# Patient Record
Sex: MALE | Race: WHITE | Employment: FULL TIME | URBAN - METROPOLITAN AREA
[De-identification: names, ages, dates, MRNs, and addresses within clinical notes are randomized per-mention and may not be internally consistent; named-entity substitution may affect disease eponyms.]

---

## 2017-01-03 ENCOUNTER — HOSPITAL ENCOUNTER (OUTPATIENT)
Dept: PHYSICAL THERAPY | Age: 57
Discharge: HOME | End: 2017-01-03
Payer: COMMERCIAL

## 2017-01-03 PROCEDURE — 97161 PT EVAL LOW COMPLEX 20 MIN: CPT | Performed by: PHYSICAL THERAPIST

## 2017-01-03 PROCEDURE — 97014 ELECTRIC STIMULATION THERAPY: CPT | Performed by: PHYSICAL THERAPIST

## 2017-01-03 PROCEDURE — 97110 THERAPEUTIC EXERCISES: CPT | Performed by: PHYSICAL THERAPIST

## 2017-01-04 NOTE — PROGRESS NOTES
[address] [facility]  Phone: [phone]  Fax: [phone]    Plan of Care/ Statement of Necessity for Physical Therapy Services 2-15    Patient name: Mehnaz Armijo  : 1960  Provider#: [id number]  Referral source: Anish Epperson IV*      Medical/Treatment Diagnosis: Low back pain [M54.5]     Prior Hospitalization: see medical history     Comorbidities:DM, PMH of testicular cancer  Prior Level of Function: Independent, no limitations with all ADLs  Medications: Verified on Patient Summary List    Start of Care: 17      Onset Date: 2016      The [address] [place] [address] and following information is based on the information from the initial evaluation. Assessment/ key information: Patient presents with right-sided lumbar paraspinal pain with significant limitations in functional activities that require lumbar extension. 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 He did well with gentle therapeutic exercises aimed at improving core stabilization and modalities for pain control. He should continue to do well with PT to allow for a gradual return to PLOF. Evaluation Complexity History MEDIUM  Complexity : 1-2 comorbidities / personal factors will impact the outcome/ POC ; Examination MEDIUM Complexity : 3 Standardized tests and measures addressing body structure, function, activity limitation and / or participation in recreation  ;Presentation MEDIUM Complexity : Evolving with changing characteristics  ; Clinical Decision Making MEDIUM Complexity : FOTO score of 26-74  Overall Complexity Rating: MEDIUM    Problem List: pain affecting function, decrease ROM, decrease strength, impaired gait/ balance, decrease ADL/ functional abilitiies, decrease activity tolerance, decrease flexibility/ joint mobility and decrease transfer abilities   Treatment Plan may include any combination of the following: Therapeutic exercise, Therapeutic activities, Neuromuscular re-education, Physical agent/modality, Gait/balance training, Manual therapy, Patient education, Self Care training, Functional mobility training, Home safety training and Stair training  Patient / Family readiness to learn indicated by: asking questions, trying to perform skills and interest  Persons(s) to be included in education: patient (P)  Barriers to Learning/Limitations: None  Patient Goal (s): 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   Patient Self Reported Health Status: excellent  Rehabilitation Potential: excellent    Short Term Goals: To be accomplished in 8 treatments:  1. Patient will be able to pick 10# off the floor with <2/10 low back pain. 2. Patient will be able to carry 20# at his side for 50 ft. With <2/10 low back pain. 3. Patient will be able to perform a sit-to-stand with <2/10 low back pain. Long Term Goals: To be accomplished in 16 treatments:  1. Patient will be able to perform a toiletting tasks with no pain or limitation. 2. Patient will be able to perform all bed mobility tasks with no pain or limitation. 3. Patient will be able to pick 20# off the floor with no pain or limitation. Frequency / Duration: Patient to be seen 2 times per week for 8 weeks. Patient/ Caregiver education and instruction: self care, activity modification and exercises    [x]  Plan of care has been reviewed with GREGORIO Odom, PT , DPT, OCS, Cert. DN   1/4/2017 10:40 AM    ________________________________________________________________________    I certify that the above Therapy Services are being furnished while the patient is under my care. I agree with the treatment plan and certify that this therapy is necessary.     [facility] Signature:____________________  Date:____________Time: _________

## 2017-01-06 ENCOUNTER — HOSPITAL ENCOUNTER (OUTPATIENT)
Dept: PHYSICAL THERAPY | Age: 57
Discharge: HOME | End: 2017-01-06
Payer: COMMERCIAL

## 2017-01-06 PROCEDURE — 97014 ELECTRIC STIMULATION THERAPY: CPT | Performed by: PHYSICAL THERAPIST

## 2017-01-06 PROCEDURE — 97110 THERAPEUTIC EXERCISES: CPT | Performed by: PHYSICAL THERAPIST

## 2017-01-06 NOTE — PROGRESS NOTES
PT DAILY TREATMENT NOTE 2-15    Patient Name: Pattie Jimenez  Date:2017  : 1960  [x]  Patient  Verified  Payor: Ned Dose / Plan: Mynor Carlisle / Product Type: PPO /    In time:12:00 PM  Out time:1:10 PM  Total Treatment Time (min): 70  Visit #: 2     Treatment Area: Low back pain [M54.5]    SUBJECTIVE  Pain Level (0-10 scale): 1/10 at rest, 3/10 with extension  Any medication changes, allergies to medications, adverse drug reactions, diagnosis change, or new procedure performed?: [x] No    [] Yes (see summary sheet for update)  Subjective functional status/changes:   [] No changes reported  Patient reports a significant improvement in symptoms, but is still considering being held out of work next week.     OBJECTIVE    Modality rationale: decrease pain and increase tissue extensibility to improve the patients ability to lift heavy objects without pain   Min Type Additional Details   15 [x] Estim: []Att   [x]Unatt        []TENS instruct                  [x]IFC  []Premod   []NMES                     []Other:  []w/US   []w/ice   [x]w/heat  Position: supine  Location: right lumbar    []  Traction: [] Cervical       []Lumbar                       [] Prone          []Supine                       []Intermittent   []Continuous Lbs:  [] before manual  [] after manual  []w/heat    []  Ultrasound: []Continuous   [] Pulsed at:                            []1MHz   []3MHz Location:  W/cm2:    []  Paraffin         Location:  []w/heat    []  Ice     []  Heat  []  Ice massage Position:  Location:    []  Laser  []  Other: Position:  Location:    []  Vasopneumatic Device Pressure:       [] lo [] med [] hi   Temperature:    [x] Skin assessment post-treatment:  [x]intact []redness- no adverse reaction    []redness  adverse reaction:     55 min Therapeutic Exercise:  [x] See flow sheet :   Rationale: increase ROM, increase strength, improve coordination, improve balance and increase proprioception\ to improve the patients ability to lift heavy objects without pain          With   [] TE   [] TA   [] neuro   [] other: Patient Education: [x] Review HEP    [] Progressed/Changed HEP based on:   [] positioning   [] body mechanics   [] transfers   [] heat/ice application    [] other:      Pain Level (0-10 scale) post treatment: 0    ASSESSMENT/Changes in Function:     Patient will continue to benefit from skilled PT services to modify and progress therapeutic interventions, address functional mobility deficits, address ROM deficits, address strength deficits, analyze and address soft tissue restrictions, analyze and cue movement patterns, analyze and modify body mechanics/ergonomics and assess and modify postural abnormalities to attain remaining goals. []  See Plan of Care  []  See progress note/recertification  []  See Discharge Summary         Progress towards goals / Updated goals:  Patient tolerated today's progression of therapeutic exercises very well and is doing well overall. PLAN  [x]  Upgrade activities as tolerated     [x]  Continue plan of care  []  Update interventions per flow sheet       []  Discharge due to:_  []  Other:_      Monet Tong, PT , DPT, OCS, Cert.  DN   1/6/2017  1:46 PM

## 2017-01-09 ENCOUNTER — HOSPITAL ENCOUNTER (OUTPATIENT)
Dept: PHYSICAL THERAPY | Age: 57
Discharge: HOME | End: 2017-01-09
Payer: COMMERCIAL

## 2017-01-09 PROCEDURE — 97110 THERAPEUTIC EXERCISES: CPT

## 2017-01-09 PROCEDURE — 97140 MANUAL THERAPY 1/> REGIONS: CPT

## 2017-01-09 PROCEDURE — 97014 ELECTRIC STIMULATION THERAPY: CPT

## 2017-01-09 NOTE — PROGRESS NOTES
PT DAILY TREATMENT NOTE - [facility] 2-15    Patient Name: Will Ortiz  Date:2017  : 1960  [x]  Patient  Verified  Payor: Shashi Campuzano / Plan: Chantelle Lr / Product Type: PPO /    In time:1245p  Out time:105p  Total Treatment Time (min): 80  Total Timed Codes (min): 65  1:1 Treatment Time ( only): --   Visit #: 3     Treatment Area: Low back pain [M54.5]    SUBJECTIVE  Pain Level (0-10 scale): 3  Any medication changes, allergies to medications, adverse drug reactions, diagnosis change, or new procedure performed?: [x] No    [] Yes (see summary sheet for update)  Subjective functional status/changes:   [] No changes reported  Patient reports he was feeling good today, not having any pain until he lifted his foot to put his sock on and got a sharp pain in right side low back. Patient states the pain is not constant just with certain movements including lifting his leg. Patient states he did shovel his driveway by pushing the snow and only lifted once or twice, but did not have any pain after or the next day. OBJECTIVE    Modality rationale: decrease edema, decrease inflammation, decrease pain, increase tissue extensibility and increase muscle contraction/control to improve the patients ability to lift heavy objects without pain.    Min Type Additional Details   15 [x] Estim: []Att   [x]Unatt        []TENS instruct                  [x]IFC  []Premod   []NMES                     []Other:  []w/US   []w/ice   [x]w/heat  Position: Supine  Location: Low back    []  Traction: [] Cervical       []Lumbar                       [] Prone          []Supine                       []Intermittent   []Continuous Lbs:  [] before manual  [] after manual  []w/heat    []  Ultrasound: []Continuous   [] Pulsed at:                           []1MHz   []3MHz Location:  W/cm2:    [] Paraffin         Location:   []w/heat    []  Ice     []  Heat  []  Ice massage Position:  Location:    []  Laser  []  Other: Position:  Location:      []  Vasopneumatic Device Pressure:       [] lo [] med [] hi   Temperature:      [x] Skin assessment post-treatment:  [x]intact []redness- no adverse reaction    []redness  adverse reaction:     50 min Therapeutic Exercise:  [x] See flow sheet :   Rationale: increase ROM, increase strength, improve coordination, improve balance and increase proprioception to improve the patients ability to heavy objects without pain    15 min Manual Therapy: MFR to R QL, lumbar paraspinals. Rationale: decrease pain, increase ROM, increase tissue extensibility and decrease trigger points to improve the patients ability to lift heavy objects without pain. With   [x] TE   [] TA   [] neuro   [] other: Patient Education: [x] Review HEP    [x] Progressed/Changed HEP based on:   [x] positioning   [x] body mechanics   [] transfers   [] heat/ice application    [] other:      Other Objective/Functional Measures: Patient unable to do bike today due to increased pain in right low back QL area. Patient states slight pain with attempting to lay down at end of treatment, but went away quickly. Pain Level (0-10 scale) post treatment: 0/10    ASSESSMENT/Changes in Function: Patient able to tolerate increase resistance and exercise progression. Patient with increased fatigue and muscle tightness toward end of exercises. Patient with increase tissue turgor and tenderness in R QL and lumbar paraspinals improved after manual.     Patient will continue to benefit from skilled PT services to modify and progress therapeutic interventions, address functional mobility deficits, address ROM deficits, address strength deficits, analyze and address soft tissue restrictions, analyze and cue movement patterns, analyze and modify body mechanics/ergonomics, assess and modify postural abnormalities and instruct in home and community integration to attain remaining goals.      []  See Plan of Care  []  See progress note/recertification  []  See Discharge Summary         Progress towards goals / Updated goals:  Patient continues to improve in strength exercises and progression with no increase in pain.     PLAN  [x]  Upgrade activities as tolerated     [x]  Continue plan of care  [x]  Update interventions per flow sheet       []  Discharge due to:_  []  Other:_      Burnie Daily 1/9/2017  1:10 PM

## 2017-01-12 ENCOUNTER — HOSPITAL ENCOUNTER (OUTPATIENT)
Dept: PHYSICAL THERAPY | Age: 57
Discharge: HOME | End: 2017-01-12
Payer: COMMERCIAL

## 2017-01-12 PROCEDURE — 97140 MANUAL THERAPY 1/> REGIONS: CPT

## 2017-01-12 PROCEDURE — 97110 THERAPEUTIC EXERCISES: CPT

## 2017-01-12 PROCEDURE — 97014 ELECTRIC STIMULATION THERAPY: CPT

## 2017-01-12 NOTE — PROGRESS NOTES
PT DAILY TREATMENT NOTE - [facility] 2-15    Patient Name: Teresa Early  Date:2017  : 1960  [x]  Patient  Verified  Payor: Eric Pierre / Plan: Nigel Sommer / Product Type: PPO /    In time:230p  Out time:345p  Total Treatment Time (min): 75  Total Timed Codes (min): 60  1:1 Treatment Time ( only): --   Visit #: 4     Treatment Area: Low back pain [M54.5]    SUBJECTIVE  Pain Level (0-10 scale): 2/10  Any medication changes, allergies to medications, adverse drug reactions, diagnosis change, or new procedure performed?: [x] No    [] Yes (see summary sheet for update)  Subjective functional status/changes:   [] No changes reported  Patient reports his back is still a little tight and painful on the right side. Patient states he was doing his stretches before he he came and things seem a little tighter. Patient reports he has been able to get up and down from a chair without any pain or problems the last couple of days. OBJECTIVE    Modality rationale: decrease pain and increase tissue extensibility to improve the patients ability to Lift, carry, stand, walk and complete ADL's without pain.    Min Type Additional Details   15 [x] Estim: []Att   [x]Unatt        []TENS instruct                  [x]IFC  []Premod   []NMES                     []Other:  []w/US   []w/ice   [x]w/heat  Position:Supine  Location::Low back    []  Traction: [] Cervical       []Lumbar                       [] Prone          []Supine                       []Intermittent   []Continuous Lbs:  [] before manual  [] after manual  []w/heat    []  Ultrasound: []Continuous   [] Pulsed at:                           []1MHz   []3MHz Location:  W/cm2:    [] Paraffin         Location:   []w/heat    []  Ice     []  Heat  []  Ice massage Position:  Location:    []  Laser  []  Other: Position:  Location:      []  Vasopneumatic Device Pressure:       [] lo [] med [] hi   Temperature:      [x] Skin assessment post-treatment: [x]intact []redness- no adverse reaction    []redness  adverse reaction:     50 min Therapeutic Exercise:  [] See flow sheet :   Rationale: increase ROM and increase strength to improve the patients ability to Lift, carry, stand, walk and complete ADL's without pain. 10 min Manual Therapy: MFR to R QL, lumbar paraspinals and erector spinae    Rationale: decrease pain, increase ROM, increase tissue extensibility and decrease trigger points to improve the patients ability to Lift, carry, stand, walk and complete ADL's without pain. With   [x] TE   [] TA   [] neuro   [] other: Patient Education: [x] Review HEP    [x] Progressed/Changed HEP based on:   [x] positioning   [x] body mechanics   [] transfers   [] heat/ice application    [] other:      Other Objective/Functional Measures:      Pain Level (0-10 scale) post treatment: 0/10    ASSESSMENT/Changes in Function: Patient with increased TTP and tissue turgor R QL and lumbar paraspinals improved after manual. Patient demonstrates improved strength with less fatigue by end of exercises. Patient will continue to benefit from skilled PT services to modify and progress therapeutic interventions, address functional mobility deficits, address ROM deficits, address strength deficits, analyze and address soft tissue restrictions, analyze and cue movement patterns, analyze and modify body mechanics/ergonomics and instruct in home and community integration to attain remaining goals. []  See Plan of Care  []  See progress note/recertification  []  See Discharge Summary         Progress towards goals / Updated goals:  Patient making excellent progress towards goals. Patient able to perform there-exs and progression without increased pain.      PLAN  [x]  Upgrade activities as tolerated     [x]  Continue plan of care  [x]  Update interventions per flow sheet       []  Discharge due to:_  []  Other:_      Beacher Jump 1/12/2017  2:40 PM

## 2017-01-16 ENCOUNTER — HOSPITAL ENCOUNTER (OUTPATIENT)
Dept: PHYSICAL THERAPY | Age: 57
Discharge: HOME | End: 2017-01-16
Payer: COMMERCIAL

## 2017-01-16 PROCEDURE — 97110 THERAPEUTIC EXERCISES: CPT

## 2017-01-16 PROCEDURE — 97014 ELECTRIC STIMULATION THERAPY: CPT

## 2017-01-19 ENCOUNTER — HOSPITAL ENCOUNTER (OUTPATIENT)
Dept: PHYSICAL THERAPY | Age: 57
Discharge: HOME | End: 2017-01-19
Payer: COMMERCIAL

## 2017-01-19 PROCEDURE — 97014 ELECTRIC STIMULATION THERAPY: CPT | Performed by: PHYSICAL THERAPIST

## 2017-01-19 PROCEDURE — 97110 THERAPEUTIC EXERCISES: CPT | Performed by: PHYSICAL THERAPIST

## 2017-01-19 NOTE — PROGRESS NOTES
PT DAILY TREATMENT NOTE - [facility] 2-15    Patient Name: Guevara Peterson  Date:2017  : 1960  [x]  Patient  Verified  Payor: Pham Joseline / Plan: Geovanny Divers / Product Type: PPO /    In time:4:00 PM  Out time:5:15 PM  Total Treatment Time (min): 75  Total Timed Codes (min): 60  1:1 Treatment Time ( only): --   Visit #: 6    Treatment Area: Low back pain [M54.5]    SUBJECTIVE  Pain Level (0-10 scale): 1  Any medication changes, allergies to medications, adverse drug reactions, diagnosis change, or new procedure performed?: [x] No    [] Yes (see summary sheet for update)  Subjective functional status/changes:   [] No changes reported  Patient reports he continues to feel good, but he will see this weekend as he returns to work tomorrow. OBJECTIVE    Modality rationale: decrease pain and increase tissue extensibility to improve the patients ability to lift, carry, sit, stand, ambulate and complete ADL's without pain.    Min Type Additional Details   15 [x] Estim: []Att   [x]Unatt        []TENS instruct                  []IFC  []Premod   []NMES                     []Other:  []w/US   []w/ice   [x]w/heat  Position:supine  Location:Back    []  Traction: [] Cervical       []Lumbar                       [] Prone          []Supine                       []Intermittent   []Continuous Lbs:  [] before manual  [] after manual  []w/heat    []  Ultrasound: []Continuous   [] Pulsed at:                           []1MHz   []3MHz Location:  W/cm2:    [] Paraffin         Location:   []w/heat    []  Ice     []  Heat  []  Ice massage Position:  Location:    []  Laser  []  Other: Position:  Location:      []  Vasopneumatic Device Pressure:       [] lo [] med [] hi   Temperature:      [x] Skin assessment post-treatment:  [x]intact []redness- no adverse reaction    []redness  adverse reaction:     60 min Therapeutic Exercise:  [x] See flow sheet :   Rationale: increase ROM and increase strength to improve the patients ability to lift, carry, sit, stand, ambulate and complete ADL's without pain. With   [x] TE   [] TA   [] neuro   [] other: Patient Education: [x] Review HEP    [x] Progressed/Changed HEP based on:   [] positioning   [] body mechanics   [] transfers   [] heat/ice application    [] other:      Other Objective/Functional Measures:      Pain Level (0-10 scale) post treatment: 0    ASSESSMENT/Changes in Function: Patient able to ride bike for 5 min today with no pain or tightness in back. Patient will continue to benefit from skilled PT services to modify and progress therapeutic interventions, address functional mobility deficits, address ROM deficits, address strength deficits, analyze and address soft tissue restrictions, analyze and cue movement patterns, analyze and modify body mechanics/ergonomics and instruct in home and community integration to attain remaining goals. []  See Plan of Care  []  See progress note/recertification  []  See Discharge Summary         Progress towards goals / Updated goals: Will f/u with patient this weekend on progress with core stability, but patient looks ready to return to work at this time. PLAN  [x]  Upgrade activities as tolerated     [x]  Continue plan of care  [x]  Update interventions per flow sheet       []  Discharge due to:_  []  Other:_      Carolyn Sanderson, PT , DPT, OCS, Cert.  DN   1/19/2017  1:46 PM

## 2017-01-23 ENCOUNTER — HOSPITAL ENCOUNTER (OUTPATIENT)
Dept: PHYSICAL THERAPY | Age: 57
Discharge: HOME | End: 2017-01-23
Payer: COMMERCIAL

## 2017-01-23 PROCEDURE — 97014 ELECTRIC STIMULATION THERAPY: CPT

## 2017-01-23 PROCEDURE — 97110 THERAPEUTIC EXERCISES: CPT

## 2017-01-23 PROCEDURE — 97140 MANUAL THERAPY 1/> REGIONS: CPT

## 2017-01-23 NOTE — PROGRESS NOTES
PT DAILY TREATMENT NOTE - [facility] 2-15    Patient Name: Ethan Benitez  Date:2017  : 1960  [x]  Patient  Verified  Payor: Lm Tong / Plan: Cherri Diaz / Product Type: PPO /    In time:115p  Out time:230p  Total Treatment Time (min): 75  Total Timed Codes (min): 60  1:1 Treatment Time ( only): --   Visit #: 7     Treatment Area: Low back pain [M54.5]    SUBJECTIVE  Pain Level (0-10 scale): 3/10  Any medication changes, allergies to medications, adverse drug reactions, diagnosis change, or new procedure performed?: [x] No    [] Yes (see summary sheet for update)  Subjective functional status/changes:   [] No changes reported  Patient reports he worked this weekend for the first time since he started coming in and his back pain increased. Patient states after the first day his pain was 8/10 and was unable to do any exercises at home. He states the rest of the weekend he was at 6-7 pain after work. OBJECTIVE    Modality rationale: decrease pain and increase tissue extensibility to improve the patients ability to  lift, carry, reach sit, stand, ambulate and complete ADL's without pain.    Min Type Additional Details   15 [x] Estim: []Att   [x]Unatt        []TENS instruct                  []IFC  []Premod   []NMES                     []Other:  []w/US   []w/ice   [x]w/heat  Position:Supine  Location: backqa    []  Traction: [] Cervical       []Lumbar                       [] Prone          []Supine                       []Intermittent   []Continuous Lbs:  [] before manual  [] after manual  []w/heat    []  Ultrasound: []Continuous   [] Pulsed at:                           []1MHz   []3MHz Location:  W/cm2:    [] Paraffin         Location:   []w/heat    []  Ice     []  Heat  []  Ice massage Position:  Location:    []  Laser  []  Other: Position:  Location:      []  Vasopneumatic Device Pressure:       [] lo [] med [] hi   Temperature:      [x] Skin assessment post-treatment:  [x]intact []redness- no adverse reaction    []redness  adverse reaction:     45 min Therapeutic Exercise:  [] See flow sheet :   Rationale: increase ROM and increase strength to improve the patients ability to lift, carry, reach sit, stand, ambulate and complete ADL's without pain. 15 min Manual Therapy: STM R QL and Lumbar paraspinals, Manual QL stretch in left SL    Rationale: decrease pain, increase ROM, increase tissue extensibility and decrease trigger points to improve the patients ability to  lift, carry, reach sit, stand, ambulate and complete ADL's without pain. With   [x] TE   [] TA   [] neuro   [] other: Patient Education: [x] Review HEP    [x] Progressed/Changed HEP based on:   [x] positioning   [x] body mechanics   [] transfers   [] heat/ice application    [] other:      Other Objective/Functional Measures:      Pain Level (0-10 scale) post treatment: 0    ASSESSMENT/Changes in Function: Patient with increased pain while attempting bike. Patient with increased tissue turgor and tightness in  R QL improved after manual. Patient able to complete modified exercises plan without pain. Patient will continue to benefit from skilled PT services to modify and progress therapeutic interventions, address functional mobility deficits, address ROM deficits, address strength deficits, analyze and address soft tissue restrictions, analyze and cue movement patterns, analyze and modify body mechanics/ergonomics and assess and modify postural abnormalities to attain remaining goals. []  See Plan of Care  []  See progress note/recertification  []  See Discharge Summary         Progress towards goals / Updated goals:  Patient continues to progress, demonstrating improvement with strengthening exercises and trunk stabilization with no pain.      PLAN  [x]  Upgrade activities as tolerated     [x]  Continue plan of care  [x]  Update interventions per flow sheet       []  Discharge due to:_  []  Other:_      Carmen Stark Ortega 1/23/2017  1:32 PM

## 2017-01-26 ENCOUNTER — HOSPITAL ENCOUNTER (OUTPATIENT)
Dept: PHYSICAL THERAPY | Age: 57
Discharge: HOME | End: 2017-01-26
Payer: COMMERCIAL

## 2017-01-26 PROCEDURE — 97014 ELECTRIC STIMULATION THERAPY: CPT

## 2017-01-26 PROCEDURE — 97140 MANUAL THERAPY 1/> REGIONS: CPT

## 2017-01-26 PROCEDURE — 97110 THERAPEUTIC EXERCISES: CPT

## 2017-01-26 NOTE — PROGRESS NOTES
PT DAILY TREATMENT NOTE - [facility] 2-15    Patient Name: Penelope Ernst  Date:2017  : 1960  [x]  Patient  Verified  Payor: Shaan Bailey / Plan: Efra Mendez / Product Type: PPO /    In time:210p  Out time:325p  Total Treatment Time (min): 75  Total Timed Codes (min): 60  1:1 Treatment Time ( only): --   Visit #: 8     Treatment Area: Low back pain [M54.5]    SUBJECTIVE  Pain Level (0-10 scale): 1, more muscle soreness than any pain  Any medication changes, allergies to medications, adverse drug reactions, diagnosis change, or new procedure performed?: [x] No    [] Yes (see summary sheet for update)  Subjective functional status/changes:   [] No changes reported  Patient reports he is doing really well today. Patient states he starts working three 12 hour days this weekend and is a little nervous. OBJECTIVE    Modality rationale: decrease pain and increase tissue extensibility to improve the patients ability to sit, stand, lift, reach, carry ambulate and complete ALD's without pain.    Min Type Additional Details   15 [x] Estim: []Att   [x]Unatt        []TENS instruct                  [x]IFC  []Premod   []NMES                     []Other:  []w/US   []w/ice   [x]w/heat  Position:Supine  Location:R back    []  Traction: [] Cervical       []Lumbar                       [] Prone          []Supine                       []Intermittent   []Continuous Lbs:  [] before manual  [] after manual  []w/heat    []  Ultrasound: []Continuous   [] Pulsed at:                           []1MHz   []3MHz Location:  W/cm2:    [] Paraffin         Location:   []w/heat    []  Ice     []  Heat  []  Ice massage Position:  Location:    []  Laser  []  Other: Position:  Location:      []  Vasopneumatic Device Pressure:       [] lo [] med [] hi   Temperature:      [x] Skin assessment post-treatment:  [x]intact []redness- no adverse reaction    []redness  adverse reaction:     50 min Therapeutic Exercise:  [x] See flow sheet :   Rationale: increase ROM, increase strength, improve coordination, improve balance and increase proprioception to improve the patients ability to sit, stand, lift, reach, carry ambulate and complete ALD's without pain. 10 min Manual Therapy: MFR Lumbar paraspinals, QL    Rationale: decrease pain, increase ROM, increase tissue extensibility and decrease trigger points to improve the patients ability to sit, stand, lift, reach, carry ambulate and complete ALD's without pain. With   [x] TE   [] TA   [] neuro   [] other: Patient Education: [x] Review HEP    [x] Progressed/Changed HEP based on:   [x] positioning   [x] body mechanics   [] transfers   [] heat/ice application    [] other:      Other Objective/Functional Measures:      Pain Level (0-10 scale) post treatment: 0    ASSESSMENT/Changes in Function: Patient able to tolerate increased resistance with no pain throughout. Minimal tissue turgor present in R lumbar paraspinals which improved after manual. Educated patient on stretching while at work, when possible, to prevent increased pain and tightness by end of shift. Patient will continue to benefit from skilled PT services to modify and progress therapeutic interventions, address functional mobility deficits, address ROM deficits, address strength deficits, analyze and address soft tissue restrictions, analyze and cue movement patterns, analyze and modify body mechanics/ergonomics and assess and modify postural abnormalities to attain remaining goals. []  See Plan of Care  []  See progress note/recertification  []  See Discharge Summary         Progress towards goals / Updated goals:  Patient able to tolerate increased resistance with no pain making progress towards functional goals.     PLAN  [x]  Upgrade activities as tolerated     [x]  Continue plan of care  [x]  Update interventions per flow sheet       []  Discharge due to:_  []  Other:_      Rick Guevara 1/26/2017  2:13 PM

## 2017-01-30 ENCOUNTER — HOSPITAL ENCOUNTER (OUTPATIENT)
Dept: PHYSICAL THERAPY | Age: 57
Discharge: HOME | End: 2017-01-30
Payer: COMMERCIAL

## 2017-01-30 PROCEDURE — 97140 MANUAL THERAPY 1/> REGIONS: CPT

## 2017-01-30 PROCEDURE — 97014 ELECTRIC STIMULATION THERAPY: CPT

## 2017-01-30 PROCEDURE — 97110 THERAPEUTIC EXERCISES: CPT

## 2017-01-30 NOTE — PROGRESS NOTES
PT DAILY TREATMENT NOTE - [facility] 2-15    Patient Name: Isaac Jensen  Date:2017  : 1960  [x]  Patient  Verified  Payor: Ramos Marte / Plan: Tamra Werner / Product Type: PPO /    In time:1230p  Out time:140p  Total Treatment Time (min): 70  Total Timed Codes (min): 50  1:1 Treatment Time ( only): --   Visit #: 9     Treatment Area: Low back pain [M54.5]    SUBJECTIVE  Pain Level (0-10 scale): 1  Any medication changes, allergies to medications, adverse drug reactions, diagnosis change, or new procedure performed?: [x] No    [] Yes (see summary sheet for update)  Subjective functional status/changes:   [] No changes reported  Patient reported he started working 12 hour shifts and his back was hurting more 3-4/10 pain and was more irritated. Patient reports the pain and irritation slowly got better as the weekend progressed. OBJECTIVE    Modality rationale: decrease pain and increase tissue extensibility to improve the patients ability to  sit, stand, lift, reach, carry and complete ADL's without pain.    Min Type Additional Details   15 [x] Estim: []Att   [x]Unatt        []TENS instruct                  []IFC  [x]Premod   []NMES                     []Other:  []w/US   []w/ice   [x]w/heat  Position:supine  Location:back    []  Traction: [] Cervical       []Lumbar                       [] Prone          []Supine                       []Intermittent   []Continuous Lbs:  [] before manual  [] after manual  []w/heat    []  Ultrasound: []Continuous   [] Pulsed at:                           []1MHz   []3MHz Location:  W/cm2:    [] Paraffin         Location:   []w/heat    []  Ice     []  Heat  []  Ice massage Position:  Location:    []  Laser  []  Other: Position:  Location:      []  Vasopneumatic Device Pressure:       [] lo [] med [] hi   Temperature:      [x] Skin assessment post-treatment:  [x]intact []redness- no adverse reaction    []redness  adverse reaction:     50 min Therapeutic Exercise:  [x] See flow sheet :   Rationale: increase ROM and increase strength to improve the patients ability to sit, stand, lift, reach, carry and complete ADL's without pain. 10 min Manual Therapy: MFR R QL, lumbar paraspinals, Erector Spinae, Manual QL stretch in sidelying. Rationale: decrease pain, increase ROM, increase tissue extensibility and decrease trigger points to improve the patients ability to  sit, stand, lift, reach, carry and complete ADL's without pain. With   [x] TE   [] TA   [] neuro   [] other: Patient Education: [x] Review HEP    [x] Progressed/Changed HEP based on:   [] positioning   [] body mechanics   [] transfers   [] heat/ice application    [] other:      Other Objective/Functional Measures: Patient with mild irritation at start of bike which got better as he progressed and was able to complete 10 min with no irritation by the end. Pain Level (0-10 scale) post treatment: 0    ASSESSMENT/Changes in Function: Patient with increase tightness R QL, lumbar paraspinals which improved after manual. Patient able to progress exercises with no increased symptoms or pain. Patient will continue to benefit from skilled PT services to modify and progress therapeutic interventions, address functional mobility deficits, address ROM deficits, address strength deficits, analyze and address soft tissue restrictions, analyze and cue movement patterns, analyze and modify body mechanics/ergonomics and assess and modify postural abnormalities to attain remaining goals. []  See Plan of Care  []  See progress note/recertification  []  See Discharge Summary         Progress towards goals / Updated goals:  Patient making excellent progress towards function goals with improved tolerance with standing. Patient able to work longer shift with decreased pain as compared to previous weekend.     PLAN  [x]  Upgrade activities as tolerated     [x]  Continue plan of care  [x]  Update interventions per flow sheet       []  Discharge due to:_  []  Other:_      Jesus Alberto Wolfe 1/30/2017  12:29 PM

## 2017-02-02 ENCOUNTER — HOSPITAL ENCOUNTER (OUTPATIENT)
Dept: PHYSICAL THERAPY | Age: 57
Discharge: HOME | End: 2017-02-02
Payer: COMMERCIAL

## 2017-02-02 PROCEDURE — 97110 THERAPEUTIC EXERCISES: CPT | Performed by: PHYSICAL THERAPIST

## 2017-02-02 PROCEDURE — 97014 ELECTRIC STIMULATION THERAPY: CPT | Performed by: PHYSICAL THERAPIST

## 2017-02-02 NOTE — PROGRESS NOTES
PT DAILY TREATMENT NOTE - [facility] 2-15    Patient Name: Teresa Early  Date:2017  : 1960  [x]  Patient  Verified  Payor: Eric Pierre / Plan: Nigel Sommer / Product Type: PPO /    In time:12:00 PM  Out time:1:10 PM  Total Treatment Time (min): 70  Total Timed Codes (min): 55  1:1 Treatment Time ( only): --   Visit #: 10    Treatment Area: Low back pain [M54.5]    SUBJECTIVE  Pain Level (0-10 scale): 1  Any medication changes, allergies to medications, adverse drug reactions, diagnosis change, or new procedure performed?: [x] No    [] Yes (see summary sheet for update)  Subjective functional status/changes:   [] No changes reported  Patient reports a significant improvement overall and is looking to get back into the gym to continue strengthening his back. OBJECTIVE    Modality rationale: decrease pain and increase tissue extensibility to improve the patients ability to  sit, stand, lift, reach, carry and complete ADL's without pain.    Min Type Additional Details   15 [x] Estim: []Att   [x]Unatt        []TENS instruct                  []IFC  [x]Premod   []NMES                     []Other:  []w/US   []w/ice   [x]w/heat  Position:supine  Location:back    []  Traction: [] Cervical       []Lumbar                       [] Prone          []Supine                       []Intermittent   []Continuous Lbs:  [] before manual  [] after manual  []w/heat    []  Ultrasound: []Continuous   [] Pulsed at:                           []1MHz   []3MHz Location:  W/cm2:    [] Paraffin         Location:   []w/heat    []  Ice     []  Heat  []  Ice massage Position:  Location:    []  Laser  []  Other: Position:  Location:      []  Vasopneumatic Device Pressure:       [] lo [] med [] hi   Temperature:      [x] Skin assessment post-treatment:  [x]intact []redness- no adverse reaction    []redness  adverse reaction:     55 min Therapeutic Exercise:  [x] See flow sheet :   Rationale: increase ROM and increase strength to improve the patients ability to sit, stand, lift, reach, carry and complete ADL's without pain. With   [x] TE   [] TA   [] neuro   [] other: Patient Education: [x] Review HEP    [x] Progressed/Changed HEP based on:   [] positioning   [] body mechanics   [] transfers   [] heat/ice application    [] other:      Other Objective/Functional Measures:      Pain Level (0-10 scale) post treatment: 0    ASSESSMENT/Changes in Function:     Patient will continue to benefit from skilled PT services to modify and progress therapeutic interventions, address functional mobility deficits, address ROM deficits, address strength deficits, analyze and address soft tissue restrictions, analyze and cue movement patterns, analyze and modify body mechanics/ergonomics and assess and modify postural abnormalities to attain remaining goals. []  See Plan of Care  []  See progress note/recertification  [x]  See Discharge Summary         PLAN  [x]  Upgrade activities as tolerated     [x]  Continue plan of care  [x]  Update interventions per flow sheet       []  Discharge due to:_  []  Other:_      Carolyn Sanderson, PT , DPT, OCS, Cert.  DN   2/2/2017  12:29 PM

## 2017-05-04 NOTE — PROGRESS NOTES
[address] [place] [facility], [address] [place] [address] Drive  Phone: [phone]  Fax: [phone]    Discharge Summary  2-15    Patient name: Isaac Jensen  : 1960  Provider#: [id number]  Referral source: Meg Faye IV*      Medical/Treatment Diagnosis: Low back pain [M54.5]     Prior Hospitalization: see medical history     Comorbidities: See Plan of Care  Prior Level of Function:See Plan of Care  Medications: Verified on Patient Summary List    Start of Care: 17      Onset Date:2016   Visits from Start of Care: 10     Missed Visits: 0  Reporting Period : 17 to 17      ASSESSMENT/SUMMARY OF CARE: Patient did very well through 4 weeks of PT with an achievement of full lumbar AROM, improved lumbopelvic stability, and improved lifting tolerance while at work. He was able to return to work at full duty and on his last visit he had achieved all long term goals. He no longer requires PT services at this time and will be discharged. Short Term Goals: To be accomplished in 8 treatments:  1. Patient will be able to pick 10# off the floor with <2/10 low back pain. Met.  2. Patient will be able to carry 20# at his side for 50 ft. With <2/10 low back pain. Met.  3. Patient will be able to perform a sit-to-stand with <2/10 low back pain. Met.  Long Term Goals: To be accomplished in 16 treatments:  1. Patient will be able to perform a toiletting tasks with no pain or limitation. Met.  2. Patient will be able to perform all bed mobility tasks with no pain or limitation. Met.  3. Patient will be able to pick 20# off the floor with no pain or limitation. Met.        RECOMMENDATIONS:  [x]Discontinue therapy: [x]Patient has reached or is progressing toward set goals      []Patient is non-compliant or has abdicated      []Due to lack of appreciable progress towards set goals      []Other    Julia Nurse, PT , DPT, OCS, Cert.  DN   2017 10:57 AM

## 2018-08-17 ENCOUNTER — OFFICE VISIT (OUTPATIENT)
Dept: FAMILY MEDICINE CLINIC | Age: 58
End: 2018-08-17

## 2018-08-17 VITALS
HEART RATE: 99 BPM | BODY MASS INDEX: 35.22 KG/M2 | OXYGEN SATURATION: 96 % | HEIGHT: 74 IN | RESPIRATION RATE: 16 BRPM | TEMPERATURE: 96.9 F | SYSTOLIC BLOOD PRESSURE: 119 MMHG | WEIGHT: 274.4 LBS | DIASTOLIC BLOOD PRESSURE: 69 MMHG

## 2018-08-17 VITALS
OXYGEN SATURATION: 96 % | HEIGHT: 74 IN | WEIGHT: 274.4 LBS | HEART RATE: 99 BPM | TEMPERATURE: 96.9 F | DIASTOLIC BLOOD PRESSURE: 69 MMHG | BODY MASS INDEX: 35.22 KG/M2 | SYSTOLIC BLOOD PRESSURE: 119 MMHG | RESPIRATION RATE: 16 BRPM

## 2018-08-17 DIAGNOSIS — S39.012A STRAIN OF LUMBAR REGION, INITIAL ENCOUNTER: Primary | ICD-10-CM

## 2018-08-17 RX ORDER — CYCLOBENZAPRINE HCL 10 MG
10 TABLET ORAL
Qty: 60 TAB | Refills: 3 | Status: SHIPPED | OUTPATIENT
Start: 2018-08-17 | End: 2019-07-25 | Stop reason: SDUPTHER

## 2018-08-17 NOTE — PROGRESS NOTES
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  5/10 at visit

## 2018-08-17 NOTE — MR AVS SNAPSHOT
Terence Whyte 
 
 
 [address], [place] [address] [facility] [address] 
[phone] Patient: Millie Elena MRN: [id number] [id number] Visit Information Date & Time Provider Department Dept. Phone Encounter #  
 8/17/2018  9:00 AM Kyle Jeffery, [address] [facility] [phone] [id number] Follow-up Instructions Return if symptoms worsen or fail to improve. Your Appointments 8/27/2018 10:45 AM  
ESTABLISHED PATIENT with MD Nataliya Salcido TURNER, [address] [facility] ([facility] Appt Note: 4m  
 [address] [address] [facility] [phone]  
[phone]  
  
   
 [address] [place] [facility] [address] [phone] Upcoming Health Maintenance Date Due Pneumococcal 19-64 Highest Risk (1 of 3 - PCV13) 5/26/1979 DTaP/Tdap/Td series (1 - Tdap) 5/26/1981 COLONOSCOPY 5/7/2012 FOOT EXAM Q1 12/8/2017 Influenza Age 5 to Adult 8/1/2018 HEMOGLOBIN A1C Q6M 12/19/2018 EYE EXAM RETINAL OR DILATED Q1 12/20/2018 MICROALBUMIN Q1 1/8/2019 LIPID PANEL Q1 6/19/2019 Allergies as of 8/17/2018  Review Complete On: 8/17/2018 By: Torri Solares LPN No Known Allergies Current Immunizations  Never Reviewed No immunizations on file. Not reviewed this visit You Were Diagnosed With   
  
 Codes Comments Strain of lumbar region, initial encounter    -  Primary ICD-10-CM: D64.855B ICD-9-CM: 784. 2 Vitals BP Pulse Temp Resp Height(growth percentile) Weight(growth percentile)  
 119/69 (BP 1 Location: Left arm, BP Patient Position: Sitting) 99 96.9 °F (36.1 °C) (Oral) 16 6' 2\" (1.88 m) 274 lb 6.4 oz (124.5 kg) SpO2 BMI Smoking Status 96% 35.23 kg/m2 Former Smoker Vitals History BMI and BSA Data Body Mass Index Body Surface Area  
 35.23 kg/m 2 2.55 m 2 Preferred Pharmacy Pharm[facility] Name Phone [id number] [address] [facility] [phone] Your Updated Medication List  
  
   
This list is accurate as of 8/17/18  9:11 AM.  Always use your most recent med list.  
  
  
  
  
 cyclobenzaprine 10 mg tablet Commonly known as:  FLEXERIL Take 1 Tab by mouth three (3) times daily as needed for Muscle Spasm(s). glipiZIDE 10 mg tablet Commonly known as:  GLUCOTROL  
TAKE 1 TABLET BY MOUTH TWICE A DAY  
  
 glucose blood VI test strips strip Commonly known as:  FREESTYLE LITE STRIPS  
CHECK TWICE DAILY  
  
 ibuprofen 200 mg tablet Commonly known as:  MOTRIN Take 200 mg by mouth every four (4) hours as needed for Pain. insulin glargine 100 unit/mL (3 mL) Inpn Commonly known as:  BASAGLAR KWIKPEN U-100 INSULIN  
20 units daily Insulin Needles (Disposable) 30 gauge x 1/3\" Use daily  
  
 metFORMIN 1,000 mg tablet Commonly known as:  GLUCOPHAGE  
TAKE 1 TABLET BY MOUTH TWICE A DAY  
  
 rOPINIRole 1 mg tablet Commonly known as:  REQUIP  
TAKE 1 TABLET BY MOUTH DAILY  
  
 simvastatin 20 mg tablet Commonly known as:  ZOCOR  
TAKE 1 TABLET BY MOUTH AT BEDTIME  
  
 sotalol 120 mg tablet Commonly known as:  Lady Blase Take 1 Tab by mouth every twelve (12) hours. traMADol 50 mg tablet Commonly known as:  ULTRAM  
Take 1 Tab by mouth every six (6) hours as needed for Pain. Max Daily Amount: 200 mg. Prescriptions Sent to Pharmacy Refills  
 cyclobenzaprine (FLEXERIL) 10 mg tablet 3 Sig: Take 1 Tab by mouth three (3) times daily as needed for Muscle Spasm(s). Class: Normal  
 Pharmacy: [address] Ph #: [phone] Route: Oral  
  
Follow-up Instructions Return if symptoms worsen or fail to improve. Patient Instructions Back Strain: Care Instructions Your Care Instructions Back strain happens when you overstretch, or pull, a muscle in your back. You may hurt your back in an accident or when you exercise or lift something. Most back pain will get better with rest and time. You can take care of yourself at home to help your back heal. 
Follow-up care is a key part of your treatment and safety. Be sure to make and go to all appointments, and call your doctor if you are having problems. It's also a good idea to know your test results and keep a list of the medicines you take. How can you care for yourself at home? · Try to stay as active as you can, but stop or reduce any activity that causes pain. · Put ice or a cold pack on the sore muscle for 10 to 20 minutes at a time to stop swelling. Try this every 1 to 2 hours for 3 days (when you are awake) or until the swelling goes down. Put a thin cloth between the ice pack and your skin. · After 2 or 3 days, apply a heating pad on low or a warm cloth to your back. Some doctors suggest that you go back and forth between hot and cold treatments. · Take pain medicines exactly as directed. ¨ If the doctor gave you a prescription medicine for pain, take it as prescribed. ¨ If you are not taking a prescription pain medicine, ask your doctor if you can take an over-the-counter medicine. · Try sleeping on your side with a pillow between your legs. Or put a pillow under your knees when you lie on your back. These measures can ease pain in your lower back. · Return to your usual level of activity slowly. When should you call for help? Call 911 anytime you think you may need emergency care. For example, call if: 
  · You are unable to move a leg at all.  
[facility] your doctor now or seek immediate medical care if: 
  · You have new or worse symptoms in your legs, belly, or buttocks. Symptoms may include: ¨ Numbness or tingling. ¨ Weakness. ¨ Pain.  
  · You lose bladder or bowel control.  Watch closely for changes in your health, and be sure to contact your doctor if: 
  · You have a fever, lose weight, or don't feel well.  
  · You are not getting better as expected. Where can you learn more? Go to http://kat-angel.info/. Enter G406 in the search box to learn more about \"Back Strain: Care Instructions. \" Current as of: November 29, 2017 Content Version: 11.7 © 4497-4908 Healthwise, Incorporated. Care instructions adapted under license by [employer] (which disclaims liability or warranty for this information). If you have questions about a medical condition or this instruction, always ask your healthcare professional. [address] any warranty or liability for your use of this information. Introducing [facility]! [employer] introduces [employer] patient portal. Now you can access parts of your medical record, email your doctor's office, and request medication refills online. 1. In your internet browser, go to [email or web address] 2. Click on the First Time User? Click Here link in the Sign In box. You will see the New Member Sign Up page. 3. Enter your [employer] Access Code exactly as it appears below. You will not need to use this code after youve completed the sign-up process. If you do not sign up before the expiration date, you must request a new code. · [employer] Access Code: [email or web address] Expires: 11/15/2018  9:11 AM 
 
4. Enter the last four digits of your Social Security Number (xxxx) and Date of Birth (mm/dd/yyyy) as indicated and click Submit. You will be taken to the next sign-up page. 5. Create a [employer] ID. This will be your [employer] login ID and cannot be changed, so think of one that is secure and easy to remember. 6. Create a [employer] password. You can change your password at any time. 7. Enter your Password Reset Question and Answer.  This can be used at a later time if you forget your password. 8. Enter your e-mail address. You will receive e-mail notification when new information is available in [address] 9. Click Sign Up. You can now view and download portions of your medical record. 10. Click the Download Summary menu link to download a portable copy of your medical information. If you have questions, please visit the Frequently Asked Questions section of the [employer] website. Remember, [employer] is NOT to be used for urgent needs. For medical emergencies, dial 911. Now available from your iPhone and Android! Please provide this summary of care documentation to your next provider. Your primary care clinician is listed as [id number]. If you have any questions after today's visit, please call [phone].

## 2018-08-17 NOTE — PATIENT INSTRUCTIONS

## 2018-08-17 NOTE — PATIENT INSTRUCTIONS

## 2018-08-17 NOTE — PROGRESS NOTES
Subjective:     Bailee Jeffers is a 62 y.o. male who complains of low back pain for 10 hours, positional with bending or lifting, without radiation down the legs. Precipitating factors:  injury at work. 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 He was assisting a patient to the bathroom when the patient went down, he was able to lift the patient and assist them back to bed. He did not feel a \"Pop\" in his back. The pain started immediately followling the incident, and he notified his supervisor. He was able to make it through his shift with some pain. Currently he states his pain is 5/10. Prior history of back problems: recurrent self limited episodes of low back pain in the past. He had a lumbar spine microdiscectomy about 20 years ago following an injury, He has not had any problems in the last 2 years. There is no numbness in the legs. Right foot has long standing numbness from neuropathy, he states it is no worse than usual.     Symptoms are worse with sitting and are improved with standing or walking. .    Patient Active Problem List   Diagnosis Code    Small bowel obstruction, partial ([address] K56.600    Cancer ([facility]) C80.1    Low back pain M54.5    Hypercholesterolemia E78.00    Cervical sprain S13. 9XXA    Restless legs syndrome G25.81    Shoulder impingement M75.40    Diabetic neuropathy ([facility]) E11.40    Atrial fibrillation with rapid ventricular response ([facility]) I48.91    Diabetes mellitus type 2, controlled ([facility]) E11.9    Atrial fibrillation with RVR ([address] I48.91     Patient Active Problem List    Diagnosis Date Noted    Atrial fibrillation with RVR ([address] 05/07/2016    Atrial fibrillation with rapid ventricular response ([address] 04/09/2016    Diabetes mellitus type 2, controlled ([address] 04/09/2016    Shoulder impingement 01/02/2012    Diabetic neuropathy ([address] 01/02/2012    Cervical sprain 12/23/2011    Restless legs syndrome 12/23/2011    Low back pain 07/13/2011  Hypercholesterolemia 07/13/2011    Cancer [facility]     Small bowel obstruction, partial ([facility] 11/20/2010     Current Outpatient Prescriptions   Medication Sig Dispense Refill    cyclobenzaprine (FLEXERIL) 10 mg tablet Take 1 Tab by mouth three (3) times daily as needed for Muscle Spasm(s). 60 Tab 3    traMADol (ULTRAM) 50 mg tablet Take 1 Tab by mouth every six (6) hours as needed for Pain. Max Daily Amount: 200 mg. 30 Tab 0    insulin glargine (BASAGLAR KWIKPEN) 100 unit/mL (3 mL) inpn 20 units daily 15 mL 12    Insulin Needles, Disposable, 30 gauge x 1/3\" Use daily 1 Package 11    glucose blood VI test strips (FREESTYLE LITE STRIPS) strip CHECK TWICE DAILY 180 Strip 3    sotalol (BETAPACE) 120 mg tablet Take 1 Tab by mouth every twelve (12) hours. 60 Tab 11    metFORMIN (GLUCOPHAGE) 1,000 mg tablet TAKE 1 TABLET BY MOUTH TWICE A  Tab 2    ibuprofen (MOTRIN) 200 mg tablet Take 200 mg by mouth every four (4) hours as needed for Pain.  simvastatin (ZOCOR) 20 mg tablet TAKE 1 TABLET BY MOUTH AT BEDTIME (Patient taking differently: Take 20 mg by mouth daily. TAKE 1 TABLET BY MOUTH AT BEDTIME) 90 Tab 4    rOPINIRole (REQUIP) 1 mg tablet TAKE 1 TABLET BY MOUTH DAILY 90 Tab 4    glipiZIDE (GLUCOTROL) 10 mg tablet TAKE 1 TABLET BY MOUTH TWICE A  Tab 4     No Known Allergies  Past Medical History:   Diagnosis Date    Arrhythmia 04/2016    Paroxysmal Atrial Fibrillation     Cancer (HCC) 1985    Testicular-RIGHT    Cancer (HCC)     Basal Cell skin ca.      Diabetes ([facility]     Diabetic neuropathy ([facility] 1/2/2012    Hyperlipidemia     Low back pain 7/13/2011    Restless legs syndrome 12/23/2011    Shoulder impingement 1/2/2012    Sleep apnea     USES CPAP    Small bowel obstruction, partial ([address] 11/22/2010     Past Surgical History:   Procedure Laterality Date    ENDOSCOPY, COLON, DIAGNOSTIC  5/7/09    colonic polypectomies, f/u 3+ yrs    HX AMPUTATION      right 5th toe    HX APPENDECTOMY      HX COLONOSCOPY      HX ORTHOPAEDIC      lumbar spine surgery    HX OTHER SURGICAL      Spinal Surgery    HX OTHER SURGICAL      orchiectomy    HX UROLOGICAL  1985    orchiectomy for ca. -RIGHT     Family History   Problem Relation Age of Onset    High Cholesterol Father     Heart Disease Mother      afib    Arthritis-osteo Mother     Asthma Brother     Arthritis-osteo Brother     Anesth Problems Neg Hx      Social History   Substance Use Topics    Smoking status: Former Smoker     Quit date: 11/10/2014    Smokeless tobacco: Never Used      Comment: RARE SMOKING \"NO REAL SMOKING\"    Alcohol use 0.0 oz/week     0 Standard drinks or equivalent per week      Comment: rarely        Review of Systems  A comprehensive review of systems was negative except for that written in the HPI. Objective:     Visit Vitals    /69 (BP 1 Location: Left arm, BP Patient Position: Sitting)    Pulse 99    Temp 96.9 °F (36.1 °C) (Oral)    Resp 16    Ht 6' 2\" (1.88 m)    Wt 274 lb 6.4 oz (124.5 kg)    SpO2 96%    BMI 35.23 kg/m2      Patient appears to be in mild to moderate pain, antalgic gait noted. Lumbosacral spine area reveals no local tenderness or mass. Painful and reduced LS ROM noted. DTR's, motor strength and sensation normal, including heel and toe gait. Peripheral pulses are palpable. X-Ray: not indicated. Assessment/Plan:     lumbar strain    For acute pain, rest, intermittent application of heat (do not sleep on heating pad), analgesics and muscle relaxants are recommended. Discussed longer term treatment plan of prn NSAID's and discussed a home back care exercise program with flexion exercise routine. Proper lifting with avoidance of heavy lifting discussed. Consider Physical Therapy and XRay studies if not improving. Call or return to clinic prn if these symptoms worsen or fail to improve as anticipated. Flexeril ordered, He will take motrin also prn for pain.

## 2018-08-27 ENCOUNTER — OFFICE VISIT (OUTPATIENT)
Dept: FAMILY MEDICINE CLINIC | Age: 58
End: 2018-08-27

## 2018-08-27 VITALS
BODY MASS INDEX: 35.19 KG/M2 | WEIGHT: 274.2 LBS | HEIGHT: 74 IN | RESPIRATION RATE: 16 BRPM | OXYGEN SATURATION: 98 % | DIASTOLIC BLOOD PRESSURE: 76 MMHG | TEMPERATURE: 95.5 F | HEART RATE: 87 BPM | SYSTOLIC BLOOD PRESSURE: 128 MMHG

## 2018-08-27 DIAGNOSIS — S39.012A ACUTE MYOFASCIAL STRAIN OF LUMBAR REGION, INITIAL ENCOUNTER: Primary | ICD-10-CM

## 2018-08-27 PROBLEM — E11.40 TYPE 2 DIABETES MELLITUS WITH DIABETIC NEUROPATHY, WITH LONG-TERM CURRENT USE OF INSULIN (HCC): Status: ACTIVE | Noted: 2018-08-27

## 2018-08-27 PROBLEM — Z79.4 TYPE 2 DIABETES MELLITUS WITH DIABETIC NEUROPATHY, WITH LONG-TERM CURRENT USE OF INSULIN (HCC): Status: ACTIVE | Noted: 2018-08-27

## 2018-08-27 PROBLEM — E66.01 SEVERE OBESITY (BMI 35.0-39.9): Status: ACTIVE | Noted: 2018-08-27

## 2018-08-27 NOTE — MR AVS SNAPSHOT
Stefan Mcgraw 
 
 
 [address] [facility], [facility] [address] [place] [address] 
[phone] Patient: Jerry Mendoza MRN: [id number] GPO:9/90/6938 Visit Information Date & Time Provider Department Dept. Phone Encounter #  
 8/27/2018  9:45 AM Vargas Duncan MD [address] [facility] [phone] [id number] Your Appointments 8/27/2018 10:45 AM  
ESTABLISHED PATIENT with MD Sid Barahona TURNER, [facility] [place] ([facility]) Appt Note: 4m  
 [address] [facility] [address] [id number]  
[phone]  
  
   
 [address] [facility] [address] [id number] Upcoming Health Maintenance Date Due Pneumococcal 19-64 Highest Risk (1 of 3 - PCV13) 5/26/1979 DTaP/Tdap/Td series (1 - Tdap) 5/26/1981 COLONOSCOPY 5/7/2012 FOOT EXAM Q1 12/8/2017 Influenza Age 5 to Adult 8/1/2018 HEMOGLOBIN A1C Q6M 12/19/2018 EYE EXAM RETINAL OR DILATED Q1 12/20/2018 MICROALBUMIN Q1 1/8/2019 LIPID PANEL Q1 6/19/2019 Allergies as of 8/27/2018  Review Complete On: 8/27/2018 By: Vargas Duncan MD  
 No Known Allergies Current Immunizations  Never Reviewed No immunizations on file. Not reviewed this visit You Were Diagnosed With   
  
 Codes Comments Acute myofascial strain of lumbar region, initial encounter    -  Primary ICD-10-CM: S39.012A ICD-9-CM: 810. 2 Vitals BP Pulse Temp Resp Height(growth percentile) Weight(growth percentile) 128/76 87 95.5 °F (35.3 °C) (Oral) 16 6' 2\" (1.88 m) 274 lb 3.2 oz (124.4 kg) SpO2 BMI Smoking Status 98% 35.21 kg/m2 Former Smoker Vitals History BMI and BSA Data Body Mass Index Body Surface Area  
 35.21 kg/m 2 2.55 m 2 Preferred Pharmacy Pharmacy Name Phone [id number] [place], [address] AT [facility] [phone] Your Updated Medication List  
  
   
 This list is accurate as of 8/27/18 10:14 AM.  Always use your most recent med list.  
  
  
  
  
 cyclobenzaprine 10 mg tablet Commonly known as:  FLEXERIL Take 1 Tab by mouth three (3) times daily as needed for Muscle Spasm(s). glipiZIDE 10 mg tablet Commonly known as:  GLUCOTROL  
TAKE 1 TABLET BY MOUTH TWICE A DAY  
  
 glucose blood VI test strips strip Commonly known as:  FREESTYLE LITE STRIPS  
CHECK TWICE DAILY  
  
 ibuprofen 200 mg tablet Commonly known as:  MOTRIN Take 200 mg by mouth every four (4) hours as needed for Pain. insulin glargine 100 unit/mL (3 mL) Inpn Commonly known as:  BASAGLAR KWIKPEN U-100 INSULIN  
20 units daily Insulin Needles (Disposable) 30 gauge x 1/3\" Use daily  
  
 metFORMIN 1,000 mg tablet Commonly known as:  GLUCOPHAGE  
TAKE 1 TABLET BY MOUTH TWICE A DAY  
  
 rOPINIRole 1 mg tablet Commonly known as:  REQUIP  
TAKE 1 TABLET BY MOUTH DAILY  
  
 simvastatin 20 mg tablet Commonly known as:  ZOCOR  
TAKE 1 TABLET BY MOUTH AT BEDTIME  
  
 sotalol 120 mg tablet Commonly known as:  Isis Patee Take 1 Tab by mouth every twelve (12) hours. * traMADol 50 mg tablet Commonly known as:  ULTRAM  
Take 1 Tab by mouth every six (6) hours as needed for Pain. Max Daily Amount: 200 mg.  
  
 * traMADol 50 mg tablet Commonly known as:  ULTRAM  
Take 1 Tab by mouth every six (6) hours as needed for Pain. Max Daily Amount: 200 mg.  
  
 * Notice: This list has 2 medication(s) that are the same as other medications prescribed for you. Read the directions carefully, and ask your doctor or other care provider to review them with you. We Performed the Following REFERRAL TO PHYSICAL THERAPY [SQZ10 Custom] Referral Information Referral ID Referred By Referred To  
  
 [id number] Kimber Odell [facility]   
   [address]   
   M[place], [address] Phone: [phone] Visits Status Start Date End Date 1 New Request 8/27/18 8/27/19 If your referral has a status of pending review or denied, additional information will be sent to support the outcome of this decision. Patient Instructions Low Back Pain: Exercises Your Care Instructions Here are some examples of typical rehabilitation exercises for your condition. Start each exercise slowly. Ease off the exercise if you start to have pain. Your doctor or physical therapist will tell you when you can start these exercises and which ones will work best for you. How to do the exercises Press-up 1. Lie on your stomach, supporting your body with your forearms. 2. Press your elbows down into the floor to raise your upper back. As you do this, relax your stomach muscles and allow your back to arch without using your back muscles. As your press up, do not let your hips or pelvis come off the floor. 3. Hold for 15 to 30 seconds, then relax. 4. Repeat 2 to 4 times. Alternate arm and leg (bird dog) exercise Do this exercise slowly. Try to keep your body straight at all times, and do not let one hip drop lower than the other. 1. Start on the floor, on your hands and knees. 2. Tighten your belly muscles. 3. Raise one leg off the floor, and hold it straight out behind you. Be careful not to let your hip drop down, because that will twist your trunk. 4. Hold for about 6 seconds, then lower your leg and switch to the other leg. 5. Repeat 8 to 12 times on each leg. 6. Over time, work up to holding for 10 to 30 seconds each time. 7. If you feel stable and secure with your leg raised, try raising the opposite arm straight out in front of you at the same time. Knee-to-chest exercise 1. Lie on your back with your knees bent and your feet flat on the floor. 2. Bring one knee to your chest, keeping the other foot flat on the floor (or keeping the other leg straight, whichever feels better on your lower back). 3. Keep your lower back pressed to the floor. Hold for at least 15 to 30 seconds. 4. Relax, and lower the knee to the starting position. 5. Repeat with the other leg. Repeat 2 to 4 times with each leg. 6. To get more stretch, put your other leg flat on the floor while pulling your knee to your chest. 
Curl-ups 1. Lie on the floor on your back with your knees bent at a 90-degree angle. Your feet should be flat on the floor, about 12 inches from your buttocks. 2. Cross your arms over your chest. If this bothers your neck, try putting your hands behind your neck (not your head), with your elbows spread apart. 3. Slowly tighten your belly muscles and raise your shoulder blades off the floor. 4. Keep your head in line with your body, and do not press your chin to your chest. 
5. Hold this position for 1 or 2 seconds, then slowly lower yourself back down to the floor. 6. Repeat 8 to 12 times. Pelvic tilt exercise 1. Lie on your back with your knees bent. 2. \"Brace\" your stomach. This means to tighten your muscles by pulling in and imagining your belly button moving toward your spine. You should feel like your back is pressing to the floor and your hips and pelvis are rocking back. 3. Hold for about 6 seconds while you breathe smoothly. 4. Repeat 8 to 12 times. Heel dig bridging 1. Lie on your back with both knees bent and your ankles bent so that only your heels are digging into the floor. Your knees should be bent about 90 degrees. 2. Then push your heels into the floor, squeeze your buttocks, and lift your hips off the floor until your shoulders, hips, and knees are all in a straight line. 3. Hold for about 6 seconds as you continue to breathe normally, and then slowly lower your hips back down to the floor and rest for up to 10 seconds. 4. Do 8 to 12 repetitions. Hamstring stretch in doorway 1. Lie on your back in a doorway, with one leg through the open door. 2. Slide your leg up the wall to straighten your knee. You should feel a gentle stretch down the back of your leg. 3. Hold the stretch for at least 15 to 30 seconds. Do not arch your back, point your toes, or bend either knee. Keep one heel touching the floor and the other heel touching the wall. 4. Repeat with your other leg. 5. Do 2 to 4 times for each leg. Hip flexor stretch 1. Kneel on the floor with one knee bent and one leg behind you. Place your forward knee over your foot. Keep your other knee touching the floor. 2. Slowly push your hips forward until you feel a stretch in the upper thigh of your rear leg. 3. Hold the stretch for at least 15 to 30 seconds. Repeat with your other leg. 4. Do 2 to 4 times on each side. Wall sit 1. Stand with your back 10 to 12 inches away from a wall. 2. Lean into the wall until your back is flat against it. 3. Slowly slide down until your knees are slightly bent, pressing your lower back into the wall. 4. Hold for about 6 seconds, then slide back up the wall. 5. Repeat 8 to 12 times. Follow-up care is a key part of your treatment and safety. Be sure to make and go to all appointments, and call your doctor if you are having problems. It's also a good idea to know your test results and keep a list of the medicines you take. Where can you learn more? Go to http://kat-angel.info/. Enter E234 in the search box to learn more about \"Low Back Pain: Exercises. \" Current as of: November 29, 2017 Content Version: 11.7 © 5004-0571 Healthwise, Incorporated. Care instructions adapted under license by [employer] (which disclaims liability or warranty for this information). If you have questions about a medical condition or this instruction, always ask your healthcare professional. [address] any warranty or liability for your use of this information. Back Stretches: Exercises Your Care Instructions Here are some examples of exercises for stretching your back. Start each exercise slowly. Ease off the exercise if you start to have pain. Your doctor or physical therapist will tell you when you can start these exercises and which ones will work best for you. How to do the exercises Overhead stretch 5. Stand comfortably with your feet shoulder-width apart. 6. Looking straight ahead, raise both arms over your head and reach toward the ceiling. Do not allow your head to tilt back. 7. Hold for 15 to 30 seconds, then lower your arms to your sides. 8. Repeat 2 to 4 times. Side stretch 8. Stand comfortably with your feet shoulder-width apart. 9. Raise one arm over your head, and then lean to the other side. 10. Slide your hand down your leg as you let the weight of your arm gently stretch your side muscles. Hold for 15 to 30 seconds. 11. Repeat 2 to 4 times on each side. Press-up 7. Lie on your stomach, supporting your body with your forearms. 8. Press your elbows down into the floor to raise your upper back. As you do this, relax your stomach muscles and allow your back to arch without using your back muscles. As your press up, do not let your hips or pelvis come off the floor. 9. Hold for 15 to 30 seconds, then relax. 10. Repeat 2 to 4 times. Relax and rest 
 
7. Lie on your back with a rolled towel under your neck and a pillow under your knees. Extend your arms comfortably to your sides. 8. Relax and breathe normally. 9. Remain in this position for about 10 minutes. 10. If you can, do this 2 or 3 times each day. Follow-up care is a key part of your treatment and safety. Be sure to make and go to all appointments, and call your doctor if you are having problems. It's also a good idea to know your test results and keep a list of the medicines you take. Where can you learn more? Go to http://kat-angel.info/. Enter H339 in the search box to learn more about \"Back Stretches: Exercises. \" Current as of: November 29, 2017 Content Version: 11.7 © 1883-9856 [employer]Visum, Incorporated. Care instructions adapted under license by [employer]eo (which disclaims liability or warranty for this information). If you have questions about a medical condition or this instruction, always ask your healthcare professional. Daisha Ferrisgh any warranty or liability for your use of this information. Introducing [facility] [place] [facility]! [employer] introduces [employer] patient portal. Now you can access parts of your medical record, email your doctor's office, and request medication refills online. 1. In your internet browser, go to [email or web address] 2. Click on the First Time User? Click Here link in the Sign In box. You will see the New Member Sign Up page. 3. Enter your [employer] Access Code exactly as it appears below. You will not need to use this code after youve completed the sign-up process. If you do not sign up before the expiration date, you must request a new code. · [employer] Access Code: [email or web address] Expires: 11/15/2018  9:11 AM 
 
4. Enter the last four digits of your Social Security Number (xxxx) and Date of Birth (mm/dd/yyyy) as indicated and click Submit. You will be taken to the next sign-up page. 5. Create a [employer] ID. This will be your [employer] login ID and cannot be changed, so think of one that is secure and easy to remember. 6. Create a [employer] password. You can change your password at any time. 7. Enter your Password Reset Question and Answer. This can be used at a later time if you forget your password. 8. Enter your e-mail address. You will receive e-mail notification when new information is available in [address] 9. Click Sign Up. You can now view and download portions of your medical record. 10. Click the Download Summary menu link to download a portable copy of your medical information. If you have questions, please visit the Frequently Asked Questions section of the [employer] website. Remember, [employer] is NOT to be used for urgent needs. For medical emergencies, dial 911. Now available from your iPhone and Android! Please provide this summary of care documentation to your next provider. Your primary care clinician is listed as Vane Cyr IV. If you have any questions after today's visit, please call [phone].

## 2018-08-27 NOTE — PROGRESS NOTES
Chief Complaint   Patient presents with    Follow-up     Follow up for back pain from injury August 16.2018

## 2018-08-27 NOTE — PATIENT INSTRUCTIONS
Low Back Pain: Exercises  Your Care Instructions  Here are some examples of typical rehabilitation exercises for your condition. Start each exercise slowly. Ease off the exercise if you start to have pain. Your doctor or physical therapist will tell you when you can start these exercises and which ones will work best for you. How to do the exercises  Press-up    1. Lie on your stomach, supporting your body with your forearms. 2. Press your elbows down into the floor to raise your upper back. As you do this, relax your stomach muscles and allow your back to arch without using your back muscles. As your press up, do not let your hips or pelvis come off the floor. 3. Hold for 15 to 30 seconds, then relax. 4. Repeat 2 to 4 times. Alternate arm and leg (bird dog) exercise    Do this exercise slowly. Try to keep your body straight at all times, and do not let one hip drop lower than the other. 1. Start on the floor, on your hands and knees. 2. Tighten your belly muscles. 3. Raise one leg off the floor, and hold it straight out behind you. Be careful not to let your hip drop down, because that will twist your trunk. 4. Hold for about 6 seconds, then lower your leg and switch to the other leg. 5. Repeat 8 to 12 times on each leg. 6. Over time, work up to holding for 10 to 30 seconds each time. 7. If you feel stable and secure with your leg raised, try raising the opposite arm straight out in front of you at the same time. Knee-to-chest exercise    1. Lie on your back with your knees bent and your feet flat on the floor. 2. Bring one knee to your chest, keeping the other foot flat on the floor (or keeping the other leg straight, whichever feels better on your lower back). 3. Keep your lower back pressed to the floor. Hold for at least 15 to 30 seconds. 4. Relax, and lower the knee to the starting position. 5. Repeat with the other leg. Repeat 2 to 4 times with each leg.   6. To get more stretch, put your other leg flat on the floor while pulling your knee to your chest.  Curl-ups    1. Lie on the floor on your back with your knees bent at a 90-degree angle. Your feet should be flat on the floor, about 12 inches from your buttocks. 2. Cross your arms over your chest. If this bothers your neck, try putting your hands behind your neck (not your head), with your elbows spread apart. 3. Slowly tighten your belly muscles and raise your shoulder blades off the floor. 4. Keep your head in line with your body, and do not press your chin to your chest.  5. Hold this position for 1 or 2 seconds, then slowly lower yourself back down to the floor. 6. Repeat 8 to 12 times. Pelvic tilt exercise    1. Lie on your back with your knees bent. 2. \"Brace\" your stomach. This means to tighten your muscles by pulling in and imagining your belly button moving toward your spine. You should feel like your back is pressing to the floor and your hips and pelvis are rocking back. 3. Hold for about 6 seconds while you breathe smoothly. 4. Repeat 8 to 12 times. Heel dig bridging    1. Lie on your back with both knees bent and your ankles bent so that only your heels are digging into the floor. Your knees should be bent about 90 degrees. 2. Then push your heels into the floor, squeeze your buttocks, and lift your hips off the floor until your shoulders, hips, and knees are all in a straight line. 3. Hold for about 6 seconds as you continue to breathe normally, and then slowly lower your hips back down to the floor and rest for up to 10 seconds. 4. Do 8 to 12 repetitions. Hamstring stretch in doorway    1. Lie on your back in a doorway, with one leg through the open door. 2. Slide your leg up the wall to straighten your knee. You should feel a gentle stretch down the back of your leg. 3. Hold the stretch for at least 15 to 30 seconds. Do not arch your back, point your toes, or bend either knee.  Keep one heel touching the floor and the other heel touching the wall. 4. Repeat with your other leg. 5. Do 2 to 4 times for each leg. Hip flexor stretch    1. Kneel on the floor with one knee bent and one leg behind you. Place your forward knee over your foot. Keep your other knee touching the floor. 2. Slowly push your hips forward until you feel a stretch in the upper thigh of your rear leg. 3. Hold the stretch for at least 15 to 30 seconds. Repeat with your other leg. 4. Do 2 to 4 times on each side. Wall sit    1. Stand with your back 10 to 12 inches away from a wall. 2. Lean into the wall until your back is flat against it. 3. Slowly slide down until your knees are slightly bent, pressing your lower back into the wall. 4. Hold for about 6 seconds, then slide back up the wall. 5. Repeat 8 to 12 times. Follow-up care is a key part of your treatment and safety. Be sure to make and go to all appointments, and call your doctor if you are having problems. It's also a good idea to know your test results and keep a list of the medicines you take. Where can you learn more? Go to http://kat-angel.info/. Enter Z382 in the search box to learn more about \"Low Back Pain: Exercises. \"  Current as of: November 29, 2017  Content Version: 11.7  © 5518-9953 [employer]. Care instructions adapted under license by [employer] (which disclaims liability or warranty for this information). If you have questions about a medical condition or this instruction, always ask your healthcare professional. [place] [address] any warranty or liability for your use of this information. Back Stretches: Exercises  Your Care Instructions  Here are some examples of exercises for stretching your back. Start each exercise slowly. Ease off the exercise if you start to have pain.   Your doctor or physical therapist will tell you when you can start these exercises and which ones will work best for you.  How to do the exercises  Overhead stretch    5. Stand comfortably with your feet shoulder-width apart. 6. Looking straight ahead, raise both arms over your head and reach toward the ceiling. Do not allow your head to tilt back. 7. Hold for 15 to 30 seconds, then lower your arms to your sides. 8. Repeat 2 to 4 times. Side stretch    8. Stand comfortably with your feet shoulder-width apart. 9. Raise one arm over your head, and then lean to the other side. 10. Slide your hand down your leg as you let the weight of your arm gently stretch your side muscles. Hold for 15 to 30 seconds. 11. Repeat 2 to 4 times on each side. Press-up    7. Lie on your stomach, supporting your body with your forearms. 8. Press your elbows down into the floor to raise your upper back. As you do this, relax your stomach muscles and allow your back to arch without using your back muscles. As your press up, do not let your hips or pelvis come off the floor. 9. Hold for 15 to 30 seconds, then relax. 10. Repeat 2 to 4 times. Relax and rest    7. Lie on your back with a rolled towel under your neck and a pillow under your knees. Extend your arms comfortably to your sides. 8. Relax and breathe normally. 9. Remain in this position for about 10 minutes. 10. If you can, do this 2 or 3 times each day. Follow-up care is a key part of your treatment and safety. Be sure to make and go to all appointments, and call your doctor if you are having problems. It's also a good idea to know your test results and keep a list of the medicines you take. Where can you learn more? Go to http://kat-angel.info/. Enter K124 in the search box to learn more about \"Back Stretches: Exercises. \"  Current as of: November 29, 2017  Content Version: 11.7  © 3364-7283 [employer]. Care instructions adapted under license by [employer] (which disclaims liability or warranty for this information).  If you have questions about a medical condition or this instruction, always ask your healthcare professional. [place] [address] any warranty or liability for your use of this information.

## 2018-08-27 NOTE — PROGRESS NOTES
HISTORY OF PRESENT ILLNESS  Abhinav Lala is a 62 y.o. male. HPI  Presents for follow up on back pain. 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 States pain started to improve however he worked last night and felt the pain worsening again especially with prolonged sitting and changing position. He has not tried any meds overnight as he was at work but states ibuprofen and flexeril help sometimes. Review of Systems   Constitutional: Negative for chills and fever. Musculoskeletal: Positive for back pain. Negative for falls and neck pain. Neurological: Negative for tingling, sensory change and focal weakness. All other systems reviewed and are negative. Past Medical History:   Diagnosis Date    Arrhythmia 04/2016    Paroxysmal Atrial Fibrillation     Cancer [facility]) 1985    Testicular-RIGHT    Cancer ([address]     Basal Cell skin ca.  [facility]     Diabetic neuropathy ([facility] [address] 1/2/2012    Hyperlipidemia     Low back pain 7/13/2011    Restless legs syndrome 12/23/2011    Shoulder impingement 1/2/2012    Sleep apnea     USES CPAP    Small bowel obstruction, partial ([address] 11/22/2010     Past Surgical History:   Procedure Laterality Date    ENDOSCOPY, COLON, DIAGNOSTIC  5/7/09    colonic polypectomies, f/u 3+ yrs    HX AMPUTATION      right 5th toe    HX APPENDECTOMY      HX COLONOSCOPY      HX ORTHOPAEDIC      lumbar spine surgery    HX OTHER SURGICAL      Spinal Surgery    HX OTHER SURGICAL      orchiectomy    HX UROLOGICAL  1985    orchiectomy for ca. -RIGHT     Social History     Social History    Marital status: LEGALLY      Spouse name: N/A    Number of children: N/A    Years of education: N/A     Social History Main Topics    Smoking status: Former Smoker     Quit date: 11/10/2014    Smokeless tobacco: Never Used      Comment: RARE SMOKING \"NO REAL SMOKING\"    Alcohol use 0.0 oz/week     0 Standard drinks or equivalent per week      Comment: rarely    Drug use: No    Sexual activity: Not Asked     Other Topics Concern    None     Social History Narrative     Family History   Problem Relation Age of Onset    High Cholesterol Father     Heart Disease Mother      afib    Arthritis-osteo Mother     Asthma Brother     Arthritis-osteo Brother     Anesth Problems Neg Hx      Current Outpatient Prescriptions on File Prior to Visit   Medication Sig Dispense Refill    traMADol (ULTRAM) 50 mg tablet Take 1 Tab by mouth every six (6) hours as needed for Pain. Max Daily Amount: 200 mg. 20 Tab 0    cyclobenzaprine (FLEXERIL) 10 mg tablet Take 1 Tab by mouth three (3) times daily as needed for Muscle Spasm(s). 60 Tab 3    traMADol (ULTRAM) 50 mg tablet Take 1 Tab by mouth every six (6) hours as needed for Pain. Max Daily Amount: 200 mg. 30 Tab 0    insulin glargine (BASAGLAR KWIKPEN) 100 unit/mL (3 mL) inpn 20 units daily 15 mL 12    Insulin Needles, Disposable, 30 gauge x 1/3\" Use daily 1 Package 11    glucose blood VI test strips (FREESTYLE LITE STRIPS) strip CHECK TWICE DAILY 180 Strip 3    sotalol (BETAPACE) 120 mg tablet Take 1 Tab by mouth every twelve (12) hours. 60 Tab 11    metFORMIN (GLUCOPHAGE) 1,000 mg tablet TAKE 1 TABLET BY MOUTH TWICE A  Tab 2    ibuprofen (MOTRIN) 200 mg tablet Take 200 mg by mouth every four (4) hours as needed for Pain.  simvastatin (ZOCOR) 20 mg tablet TAKE 1 TABLET BY MOUTH AT BEDTIME (Patient taking differently: Take 20 mg by mouth daily. TAKE 1 TABLET BY MOUTH AT BEDTIME) 90 Tab 4    rOPINIRole (REQUIP) 1 mg tablet TAKE 1 TABLET BY MOUTH DAILY 90 Tab 4    glipiZIDE (GLUCOTROL) 10 mg tablet TAKE 1 TABLET BY MOUTH TWICE A  Tab 4     No current facility-administered medications on file prior to visit.       No Known Allergies    Physical Exam   Visit Vitals    /76    Pulse 87    Temp 95.5 °F (35.3 °C) (Oral)    Resp 16    Ht 6' 2\" (1.88 m)    Wt 274 lb 3.2 oz (124.4 kg)    SpO2 98%    BMI 35.21 kg/m2     General: well appearing, NAD  Resp: CTAB  CV: RRR  MSK:    Posture: Normal   Deformity: None    ROM:     Flexion: Normal    Extension: Normal     Lateral bending: Normal      Gait: Normal       Palpation:    L1-L5: No tenderness    Sacrum: No tenderness    Coccyx: No tenderness    Left Paraspinal: No tenderness    Right Paraspinal: No tenderness     Strength (0-5/5)    Hip Flexion:   Left: 5/5  Right: 5/5    Hip Extension:  Left: 5/5  Right: 5/5    Hip Abduction:  Left: 5/5  Right: 5/5    Hip Adduction:  Left: 5/5  Right: 5/5    Knee Extension:  Left: 5/5  Right: 5/5    Knee Flexion:   Left: 5/5  Right: 5/5       Sensation: Intact, no deficits      Special test:    Straight leg: Left: Negative  Right: Negative      ASSESSMENT and PLAN    ICD-10-CM ICD-9-CM    1. Acute myofascial strain of lumbar region, initial encounter S39.012A 847.2 REFERRAL TO PHYSICAL THERAPY     Slowly improving but worse after work shift last night. Continue with light duty this week, refer to PT to work on stretches/exercises, return if not improving or worsening. This note will not be viewable in [address]

## 2018-08-28 ENCOUNTER — HOSPITAL ENCOUNTER (OUTPATIENT)
Dept: PHYSICAL THERAPY | Age: 58
Discharge: HOME | End: 2018-08-28
Payer: OTHER MISCELLANEOUS

## 2018-08-28 PROCEDURE — 97162 PT EVAL MOD COMPLEX 30 MIN: CPT | Performed by: PHYSICAL THERAPIST

## 2018-08-28 PROCEDURE — 97014 ELECTRIC STIMULATION THERAPY: CPT | Performed by: PHYSICAL THERAPIST

## 2018-08-28 PROCEDURE — 97110 THERAPEUTIC EXERCISES: CPT | Performed by: PHYSICAL THERAPIST

## 2018-08-28 NOTE — PROGRESS NOTES
PT INITIAL EVALUATION NOTE 2-15 Patient Name: Penelope Ernst Date:2018 : 1960 [x]  Patient  Verified Payor: Albertina Castellano [phone] / Plan: [address] / Product Type: Workers Comp / In time:1:05 PM  Out time:1:50 PM 
Total Treatment Time (min): 45 Visit #: 1 Treatment Area: Low back pain [M54.5] SUBJECTIVE Pain Level (0-10 scale): 2/10 At worst: 8/10, pain is increased with daily activity, bending At best: 2/10, pain is decreased by taking pain medication (tramadol) and rest 
Any medication changes, allergies to medications, adverse drug reactions, diagnosis change, or new procedure performed?: [] No    [x] Yes (see summary sheet for update) Subjective:    
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 Pt reports he called employee wellness within an hour because of the pain. Pt finished his shift at work. Pain has improved since onset. PT denies pain down the leg. Pt reports history of low back pain. He was treated with PT and had a almost full resolution of symptoms. Pt is on restricted duty. 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   
    R  L Flexion     2 knees Extension    25 % Side Bending   29 inc R  28 inc L Rotation   21inc R  19 inc L         
 
 
LOWER QUARTER   MUSCLE STRENGTH 
KEY       R  L 
0 - No Contraction  L1, L2 Psoas  4  4 plus 1 - Trace   L3 Quads  5  5      
2 - Poor   L4 Tib Ant  5  5   
3 - Fair    L5 EHL  5  5   
4 - Good   S1 FHL  5  5    
5 - Normal   S2 Hams  4 plus  4 Neurological: Sensation: WNL Special Tests: SLR: R 60 deg L 45 deg Modality rationale: decrease pain and increase tissue extensibility to improve the patients ability to sit, stand, transfer, ambulate, lift, carry, reach, complete ADLs Min Type Additional Details 15 [x] Estim: []Att   [x]Unatt        []TENS instruct [x]IFC  []Premod   []NMES []Other:  []w/US   []w/ice   [x]w/heat Position: supine Location: lumbar spine  
 []  Traction: [] Cervical       []Lumbar 
                     [] Prone          []Supine []Intermittent   []Continuous Lbs: 
[] before manual 
[] after manual 
[]w/heat  
 []  Ultrasound: []Continuous   [] Pulsed at:  
                         []1MHz   []3MHz Location: 
W/cm2:  
 []  Paraffin Location: 
[]w/heat  
 []  Ice     []  Heat 
[]  Ice massage Position: Location:  
 []  Laser 
[]  Other: Position: Location:  
 []  Vasopneumatic Device Pressure:       [] lo [] med [] hi  
Temperature:   
[x] Skin assessment post-treatment:  [x]intact []redness- no adverse reaction 
  []redness  adverse reaction:  
 
15 min Therapeutic Exercise:  [x] See flow sheet :  
Rationale: increase ROM and increase strength to improve the patients ability to sit, stand, transfer, ambulate, complete ADLs With 
 [x] TE 
 [] TA 
 [] neuro 
 [] other: Patient Education: [x] Review HEP [] Progressed/Changed HEP based on:  
[x] positioning   [x] body mechanics   [] transfers   [x] heat/ice application   
[] other: Other Objective/Functional Measures:No pain with today's interventions Pain Level (0-10 scale) post treatment: 0 
 
 
ASSESSMENT:  
  
[x]  See Plan of Care Oswaldo Goldmann, KEYON 8/28/2018  12:51 PM

## 2018-09-04 ENCOUNTER — OFFICE VISIT (OUTPATIENT)
Dept: FAMILY MEDICINE CLINIC | Age: 58
End: 2018-09-04

## 2018-09-04 VITALS
DIASTOLIC BLOOD PRESSURE: 76 MMHG | HEIGHT: 74 IN | RESPIRATION RATE: 16 BRPM | WEIGHT: 271.6 LBS | BODY MASS INDEX: 34.86 KG/M2 | HEART RATE: 78 BPM | TEMPERATURE: 95.3 F | OXYGEN SATURATION: 98 % | SYSTOLIC BLOOD PRESSURE: 121 MMHG

## 2018-09-04 DIAGNOSIS — R22.1 NECK MASS: Primary | ICD-10-CM

## 2018-09-06 ENCOUNTER — HOSPITAL ENCOUNTER (OUTPATIENT)
Dept: PHYSICAL THERAPY | Age: 58
Discharge: HOME | End: 2018-09-06
Payer: OTHER MISCELLANEOUS

## 2018-09-06 PROCEDURE — 97140 MANUAL THERAPY 1/> REGIONS: CPT | Performed by: PHYSICAL THERAPY ASSISTANT

## 2018-09-06 PROCEDURE — 97014 ELECTRIC STIMULATION THERAPY: CPT | Performed by: PHYSICAL THERAPY ASSISTANT

## 2018-09-06 PROCEDURE — 97110 THERAPEUTIC EXERCISES: CPT | Performed by: PHYSICAL THERAPY ASSISTANT

## 2018-09-06 NOTE — PROGRESS NOTES
PT DAILY TREATMENT NOTE 2-15 Patient Name: Mehnaz Armijo Date:2018 : 1960 [x]  Patient  Verified Payor: Albertina Abarcadutchmanoj [id number] / Plan: [address] / Product Type: Workers Comp / In time:8:55  Out time:10:05 Total Treatment Time (min): 60 Visit #: 2 Treatment Area: Low back pain [M54.5] SUBJECTIVE Pain Level (0-10 scale): 3 Any medication changes, allergies to medications, adverse drug reactions, diagnosis change, or new procedure performed?: [x] No    [] Yes (see summary sheet for update) Subjective functional status/changes:   [] No changes reported Pt states he is doing well w/ no new issues and no problems w/ HEP. OBJECTIVE Modality rationale: decrease edema, decrease inflammation and decrease pain to improve the patients ability to perform ADL's and get in and out of the car Min Type Additional Details 15 [x] Estim: []Att   [x]Unatt        []TENS instruct [x]IFC  []Premod   []NMES []Other:  []w/US   []w/ice   [x]w/heat Position: supine Location: lumbar paraspinals  
 []  Traction: [] Cervical       []Lumbar 
                     [] Prone          []Supine []Intermittent   []Continuous Lbs: 
[] before manual 
[] after manual 
[]w/heat  
 []  Ultrasound: []Continuous   [] Pulsed at:  
                         []1MHz   []3MHz Location: 
W/cm2:  
 []  Paraffin Location: 
[]w/heat  
 []  Ice     []  Heat 
[]  Ice massage Position: Location:  
 []  Laser 
[]  Other: Position: Location:  
 []  Vasopneumatic Device Pressure:       [] lo [] med [] hi  
Temperature:   
[x] Skin assessment post-treatment:  [x]intact []redness- no adverse reaction 
  []redness  adverse reaction:  
 
30 min Therapeutic Exercise:  [x] See flow sheet :  
Rationale: increase ROM and increase strength to improve the patients ability to perform work duties 15 min Manual Therapy:  Passive hamstrings stretch bilaterally, P/A mobs to lumbar spine grade 3-4, STM to lumbar paraspinals Rationale: decrease pain, increase ROM, decrease edema  and decrease trigger points  to improve the patients ability to ambulate and transfer With 
 [] TE 
 [] TA 
 [] neuro 
 [] other: Patient Education: [x] Review HEP [] Progressed/Changed HEP based on:  
[] positioning   [] body mechanics   [] transfers   [] heat/ice application   
[] other:   
 
Other Objective/Functional Measures: Pt demonstrates decreased hamstrings flexibility on R. Pt re-educated on lifting and transfer mechanics Pain Level (0-10 scale) post treatment: 1 ASSESSMENT/Changes in Function:  
 
Patient will continue to benefit from skilled PT services to modify and progress therapeutic interventions, address functional mobility deficits, address ROM deficits, address strength deficits, analyze and address soft tissue restrictions and analyze and modify body mechanics/ergonomics to attain remaining goals. []  See Plan of Care 
[]  See progress note/recertification 
[]  See Discharge Summary Progress towards goals / Updated goals: Pt progressing towards goals PLAN 
[]  Upgrade activities as tolerated     [x]  Continue plan of care 
[]  Update interventions per flow sheet      
[]  Discharge due to:_ 
[]  Other:_   
 
Aminah Carrillo, PT, DPT 9/6/2018  8:52 AM

## 2018-09-06 NOTE — PROGRESS NOTES
HISTORY OF PRESENT ILLNESS Abhinav Lala is a 62 y.o. male. HPI This gentleman presents for follow up of back pain. He sustained an injury at work on 8/16/18 and has been followed up here in the clinic. He has just started PT and states he is feeling better No problems with urination or bowel dysfunction Review of Systems Gastrointestinal: Negative for abdominal pain. Genitourinary: Negative for dysuria and urgency. Musculoskeletal: Positive for back pain. Neurological: Negative for sensory change, speech change and focal weakness. Physical Exam  
Constitutional: He is oriented to person, place, and time. He appears well-developed and well-nourished. No distress. Abdominal: Soft. There is no tenderness. Musculoskeletal: Normal range of motion. He exhibits tenderness. He exhibits no edema. Mild ttp lumbosacral region greater on the right Neurological: He is alert and oriented to person, place, and time. He has normal reflexes. He displays normal reflexes. No cranial nerve deficit. He exhibits normal muscle tone. Coordination normal.  
Skin: He is not diaphoretic. Psychiatric: He has a normal mood and affect. ASSESSMENT and PLAN 
  ICD-10-CM ICD-9-CM 1. back pain R22.1 784.2   
workmans comp forms filled out. Precautions given

## 2018-09-11 ENCOUNTER — HOSPITAL ENCOUNTER (OUTPATIENT)
Dept: PHYSICAL THERAPY | Age: 58
Discharge: HOME | End: 2018-09-11
Payer: OTHER MISCELLANEOUS

## 2018-09-11 PROCEDURE — 97140 MANUAL THERAPY 1/> REGIONS: CPT | Performed by: PHYSICAL THERAPY ASSISTANT

## 2018-09-11 PROCEDURE — 97110 THERAPEUTIC EXERCISES: CPT | Performed by: PHYSICAL THERAPY ASSISTANT

## 2018-09-11 NOTE — PROGRESS NOTES
PT DAILY TREATMENT NOTE 2-15 Patient Name: Jodi Person Date:2018 : 1960 [x]  Patient  Verified Payor: Albertina Klinelauren Filipe Castellano 1460 / Plan: [address] / Product Type: Workers Comp / In time: 2:55  Out time: 4:10 Total Treatment Time (min): 75 Visit #: 3 Treatment Area: Low back pain [M54.5] SUBJECTIVE Pain Level (0-10 scale): 2 Any medication changes, allergies to medications, adverse drug reactions, diagnosis change, or new procedure performed?: [x] No    [] Yes (see summary sheet for update) Subjective functional status/changes:   [] No changes reported Pt states he is doing well today and having less pain throughout the day. OBJECTIVE Modality rationale: decrease edema, decrease inflammation and decrease pain to improve the patients ability to perform ADL's and get in and out of the car Min Type Additional Details 15 [x] Estim: []Att   [x]Unatt        []TENS instruct [x]IFC  []Premod   []NMES []Other:  []w/US   []w/ice   [x]w/heat Position: supine Location: lumbar paraspinals  
 []  Traction: [] Cervical       []Lumbar 
                     [] Prone          []Supine []Intermittent   []Continuous Lbs: 
[] before manual 
[] after manual 
[]w/heat  
 []  Ultrasound: []Continuous   [] Pulsed at:  
                         []1MHz   []3MHz Location: 
W/cm2:  
 []  Paraffin Location: 
[]w/heat  
 []  Ice     []  Heat 
[]  Ice massage Position: Location:  
 []  Laser 
[]  Other: Position: Location:  
 []  Vasopneumatic Device Pressure:       [] lo [] med [] hi  
Temperature:   
[x] Skin assessment post-treatment:  [x]intact []redness- no adverse reaction 
  []redness  adverse reaction:  
 
30 min Therapeutic Exercise:  [x] See flow sheet :  
Rationale: increase ROM and increase strength to improve the patients ability to perform work duties 15 min Manual Therapy:  Passive hamstrings stretch bilaterally, P/A mobs to lumbar spine grade 3-4, STM to lumbar paraspinals Rationale: decrease pain, increase ROM, decrease edema  and decrease trigger points  to improve the patients ability to ambulate and transfer With 
 [] TE 
 [] TA 
 [] neuro 
 [] other: Patient Education: [x] Review HEP [] Progressed/Changed HEP based on:  
[] positioning   [] body mechanics   [] transfers   [] heat/ice application   
[] other:   
 
Other Objective/Functional Measures: Pt demonstrates decreased hamstrings flexibility on R. Pt re-educated on lifting and transfer mechanics . Pt TTP over bilateral lumbar paraspinals. Pain Level (0-10 scale) post treatment: 1 ASSESSMENT/Changes in Function:  
 
Patient will continue to benefit from skilled PT services to modify and progress therapeutic interventions, address functional mobility deficits, address ROM deficits, address strength deficits, analyze and address soft tissue restrictions and analyze and modify body mechanics/ergonomics to attain remaining goals. [x]  See Plan of Care 
[]  See progress note/recertification 
[]  See Discharge Summary Progress towards goals / Updated goals: Pt progressing towards goals PLAN 
[]  Upgrade activities as tolerated     [x]  Continue plan of care 
[]  Update interventions per flow sheet      
[]  Discharge due to:_ 
[]  Other:_   
 
Stefani Baez, PT, DPT 9/11/2018  12:05 PM

## 2018-09-13 ENCOUNTER — OFFICE VISIT (OUTPATIENT)
Dept: FAMILY MEDICINE CLINIC | Age: 58
End: 2018-09-13

## 2018-09-13 VITALS
HEART RATE: 63 BPM | SYSTOLIC BLOOD PRESSURE: 126 MMHG | RESPIRATION RATE: 16 BRPM | BODY MASS INDEX: 34.78 KG/M2 | TEMPERATURE: 97.5 F | OXYGEN SATURATION: 97 % | WEIGHT: 271 LBS | HEIGHT: 74 IN | DIASTOLIC BLOOD PRESSURE: 86 MMHG

## 2018-09-13 DIAGNOSIS — M54.50 ACUTE LOW BACK PAIN WITHOUT SCIATICA, UNSPECIFIED BACK PAIN LATERALITY: Primary | ICD-10-CM

## 2018-09-18 ENCOUNTER — HOSPITAL ENCOUNTER (OUTPATIENT)
Dept: PHYSICAL THERAPY | Age: 58
Discharge: HOME | End: 2018-09-18
Payer: OTHER MISCELLANEOUS

## 2018-09-18 PROCEDURE — 97110 THERAPEUTIC EXERCISES: CPT | Performed by: PHYSICAL THERAPY ASSISTANT

## 2018-09-18 PROCEDURE — 97140 MANUAL THERAPY 1/> REGIONS: CPT | Performed by: PHYSICAL THERAPY ASSISTANT

## 2018-09-18 NOTE — PROGRESS NOTES
PT DAILY TREATMENT NOTE 2-15 Patient Name: Umang Barkley Date:2018 : 1960 [x]  Patient  Verified Payor: Albertina Castellano 1460 / Plan: [address] / Product Type: Workers Comp / In time: 2:55  Out time: 4:05 Total Treatment Time (min): 70 Visit #: 4 Treatment Area: Low back pain [M54.5] SUBJECTIVE Pain Level (0-10 scale): 1-2 Any medication changes, allergies to medications, adverse drug reactions, diagnosis change, or new procedure performed?: [x] No    [] Yes (see summary sheet for update) Subjective functional status/changes:   [] No changes reported Pt states he is doing much better and this is the best he has felt since the injury. OBJECTIVE Modality rationale: decrease edema, decrease inflammation and decrease pain to improve the patients ability to perform ADL's and get in and out of the car Min Type Additional Details 15 [x] Estim: []Att   [x]Unatt        []TENS instruct [x]IFC  []Premod   []NMES []Other:  []w/US   []w/ice   [x]w/heat Position: supine Location: lumbar paraspinals  
 []  Traction: [] Cervical       []Lumbar 
                     [] Prone          []Supine []Intermittent   []Continuous Lbs: 
[] before manual 
[] after manual 
[]w/heat  
 []  Ultrasound: []Continuous   [] Pulsed at:  
                         []1MHz   []3MHz Location: 
W/cm2:  
 []  Paraffin Location: 
[]w/heat  
 []  Ice     []  Heat 
[]  Ice massage Position: Location:  
 []  Laser 
[]  Other: Position: Location:  
 []  Vasopneumatic Device Pressure:       [] lo [] med [] hi  
Temperature:   
[x] Skin assessment post-treatment:  [x]intact []redness- no adverse reaction 
  []redness  adverse reaction:  
 
40 min Therapeutic Exercise:  [x] See flow sheet :  
Rationale: increase ROM and increase strength to improve the patients ability to perform work duties 15 min Manual Therapy:  Passive hamstrings stretch bilaterally, P/A mobs to lumbar spine grade 3-4, STM to lumbar paraspinals Rationale: decrease pain, increase ROM, decrease edema  and decrease trigger points  to improve the patients ability to ambulate and transfer With 
 [] TE 
 [] TA 
 [] neuro 
 [] other: Patient Education: [x] Review HEP [] Progressed/Changed HEP based on:  
[] positioning   [] body mechanics   [] transfers   [] heat/ice application   
[x] other: return to work activities and proper lifting and transfer mechanic0 Other Objective/Functional Measures: Pt able to  20 lbs from floor and transfer to shelf that is chest height w/o pain. Pt started feeling good this week and will benefit from one more week of light duty at work and then return to full duties. Pt demonstrates pain w/ lumbar flexion. Pain Level (0-10 scale) post treatment: 0 
 
ASSESSMENT/Changes in Function:  
Pt should return to full duty work next Tuesday after PT session. Pt given updated HEP re-educated on lifting mechanics. Patient will continue to benefit from skilled PT services to modify and progress therapeutic interventions, address functional mobility deficits, address ROM deficits, address strength deficits, analyze and address soft tissue restrictions and analyze and modify body mechanics/ergonomics to attain remaining goals. [x]  See Plan of Care 
[]  See progress note/recertification 
[]  See Discharge Summary Progress towards goals / Updated goals: Pt progressing towards goals PLAN 
[]  Upgrade activities as tolerated     [x]  Continue plan of care 
[]  Update interventions per flow sheet      
[]  Discharge due to:_ 
[]  Other:_   
 
Kira Hall PT, DPT 9/18/2018  12:05 PM

## 2018-09-20 ENCOUNTER — OFFICE VISIT (OUTPATIENT)
Dept: FAMILY MEDICINE CLINIC | Age: 58
End: 2018-09-20

## 2018-09-20 VITALS
RESPIRATION RATE: 16 BRPM | SYSTOLIC BLOOD PRESSURE: 119 MMHG | HEIGHT: 74 IN | DIASTOLIC BLOOD PRESSURE: 85 MMHG | TEMPERATURE: 96.1 F | WEIGHT: 275 LBS | HEART RATE: 86 BPM | BODY MASS INDEX: 35.29 KG/M2 | OXYGEN SATURATION: 98 %

## 2018-09-20 DIAGNOSIS — M54.50 ACUTE LOW BACK PAIN WITHOUT SCIATICA, UNSPECIFIED BACK PAIN LATERALITY: Primary | ICD-10-CM

## 2018-09-20 NOTE — PROGRESS NOTES
HISTORY OF PRESENT ILLNESS  Nunu Rodas is a 62 y.o. male. HPI  Patient is currently on light duty for low back injury  Last seen by PT this week, plan is to have one more PT treatment on Tuesday 9/25/18 and then released to work full duty  There is a note in his record from PT with that plan of care cited  His pain is virtually gone at this time    ROS  A comprehensive review of system was obtained and negative except findings in the HPI    Visit Vitals    /85    Pulse 86    Temp 96.1 °F (35.6 °C) (Oral)    Resp 16    Ht 6' 2\" (1.88 m)    Wt 275 lb (124.7 kg)    SpO2 98%    BMI 35.31 kg/m2     Physical Exam   Constitutional: He is oriented to person, place, and time. He appears well-developed and well-nourished. No distress. Musculoskeletal: Normal range of motion. Neurological: He is alert and oriented to person, place, and time. Psychiatric: He has a normal mood and affect. Vitals reviewed. ASSESSMENT and PLAN  Encounter Diagnoses   Name Primary?  Acute low back pain without sciatica, unspecified back pain laterality Yes     No orders of the defined types were placed in this encounter. Form completed to have return to work date of 9/26/18 with visit here first after his last PT appt on 9/25/18. I have discussed the diagnosis with the patient and the intended plan as seen in the above orders. The patient has received an after-visit summary and questions were answered concerning future plans. Patient conveyed understanding of the plan at the time of the visit.     Eva North, MSN, ANP  9/20/2018

## 2018-09-25 ENCOUNTER — HOSPITAL ENCOUNTER (OUTPATIENT)
Dept: PHYSICAL THERAPY | Age: 58
Discharge: HOME | End: 2018-09-25
Payer: OTHER MISCELLANEOUS

## 2018-09-25 PROCEDURE — 97014 ELECTRIC STIMULATION THERAPY: CPT | Performed by: PHYSICAL THERAPY ASSISTANT

## 2018-09-25 PROCEDURE — 97110 THERAPEUTIC EXERCISES: CPT | Performed by: PHYSICAL THERAPY ASSISTANT

## 2018-09-25 NOTE — PROGRESS NOTES
PT DAILY TREATMENT NOTE 2-15 Patient Name: Jermain Santana Date:2018 : 1960 [x]  Patient  Verified Payor: Albertina Villanueva iFlipe Mcmanusclemente 1460 / Plan: [address] / Product Type: Workers Comp / In time: 2:55  Out time: 4:05 Total Treatment Time (min): 70 Visit #: 8 Treatment Area: Low back pain [M54.5] SUBJECTIVE Pain Level (0-10 scale): 0-1 Any medication changes, allergies to medications, adverse drug reactions, diagnosis change, or new procedure performed?: [x] No    [] Yes (see summary sheet for update) Subjective functional status/changes:   [] No changes reported Pt states he believes his back is as good as its going to get and virtually has no pain. OBJECTIVE Modality rationale: decrease edema, decrease inflammation and decrease pain to improve the patients ability to perform ADL's and get in and out of the car Min Type Additional Details 15 [x] Estim: []Att   [x]Unatt        []TENS instruct [x]IFC  []Premod   []NMES []Other:  []w/US   []w/ice   [x]w/heat Position: supine Location: lumbar paraspinals  
 []  Traction: [] Cervical       []Lumbar 
                     [] Prone          []Supine []Intermittent   []Continuous Lbs: 
[] before manual 
[] after manual 
[]w/heat  
 []  Ultrasound: []Continuous   [] Pulsed at:  
                         []1MHz   []3MHz Location: 
W/cm2:  
 []  Paraffin Location: 
[]w/heat  
 []  Ice     []  Heat 
[]  Ice massage Position: Location:  
 []  Laser 
[]  Other: Position: Location:  
 []  Vasopneumatic Device Pressure:       [] lo [] med [] hi  
Temperature:   
[x] Skin assessment post-treatment:  [x]intact []redness- no adverse reaction 
  []redness  adverse reaction:  
 
40 min Therapeutic Exercise:  [x] See flow sheet :  
Rationale: increase ROM and increase strength to improve the patients ability to perform work duties 15 min Manual Therapy:  Passive hamstrings stretch bilaterally, P/A mobs to lumbar spine grade 3-4, STM to lumbar paraspinals Rationale: decrease pain, increase ROM, decrease edema  and decrease trigger points  to improve the patients ability to ambulate and transfer With 
 [] TE 
 [] TA 
 [] neuro 
 [] other: Patient Education: [x] Review HEP [] Progressed/Changed HEP based on:  
[] positioning   [] body mechanics   [] transfers   [] heat/ice application   
[x] other: return to work activities and proper lifting and transfer mechanic0 Other Objective/Functional Measures: Pt able to  20 lbs from floor and transfer to shelf that is chest height w/o pain. Pain Level (0-10 scale) post treatment: 0 Lumbar flexion limited by 25% w/ slight pulling Lumbar extension full Bilateral LE's 5/5 strength ASSESSMENT/Changes in Function: Pt will be released to full duty starting tomorrow and was re-educated on work modifications. Patient will continue to benefit from skilled PT services to modify and progress therapeutic interventions, address functional mobility deficits, address ROM deficits, address strength deficits, analyze and address soft tissue restrictions and analyze and modify body mechanics/ergonomics to attain remaining goals. [x]  See Plan of Care 
[]  See progress note/recertification 
[]  See Discharge Summary Progress towards goals / Updated goals: 
Short Term Goals: To be accomplished in 4 weeks: 
1) The patient will be independent with introductory HEP MET 2) The patient will demonstrate lumbar flexion AROM to mid shin to improve ease with dressing MET 3) The patient will demonstrate lumbar extension AROM to 50% to improve ease with household chores MET Long Term Goals: To be accomplished in 12 weeks: 
1) The patient will report ability to complete a full shift of work without an increase in pain Progressing 2) The paitent will demonstrate pain free lumbar AROM WFL to improve ease getting in and out of a car MET 3) The patient will demonstrate ability to lift 20# from the floor with good body mechanics MET Frequency / Duration: Patient to be seen 1 times per week for 12 weeks.  
 
PLAN 
[]  Upgrade activities as tolerated     [x]  Continue plan of care 
[]  Update interventions per flow sheet      
[]  Discharge due to:_ 
[]  Other:_   
 
Orion Prado PT, DPT 9/25/2018  12:05 PM

## 2018-09-25 NOTE — PROGRESS NOTES
[facility] [address], [address] [place] [address] Phone: [phone]  Fax: [phone] Discharge Summary  2-15 Patient name: Jono Slater  : 1960  Provider#: [id number] Referral source: Edie Sultana MD     
Medical/Treatment Diagnosis: Low back pain [M54.5] Prior Hospitalization: see medical history Comorbidities: See Plan of Care Prior Level of Function:See Plan of Care Medications: Verified on Patient Summary List 
 
Start of Care: 18      Onset Date:18 Visits from Start of Care: 5     Missed Visits: 0 Reporting Period : 18 to 18 ASSESSMENT/SUMMARY OF CARE: Pt attended PT for 5 visits and did very well therapy. Pt reports that his back is as good as it was prior to the injury at work. Pt is no longer TTP over lumbar paraspinals. Pt was re-educated on lifting mechanics and work modifications. Pt was given updated HEP and educated on contacting Dr. Misti Johnson if questions arise. Goal: 
Short Term Goals: To be accomplished in 4 weeks: 
1) The patient will be independent with introductory HEP MET 2) The patient will demonstrate lumbar flexion AROM to mid shin to improve ease with dressing MET 3) The patient will demonstrate lumbar extension AROM to 50% to improve ease with household chores MET Long Term Goals: To be accomplished in 12 weeks: 
1) The patient will report ability to complete a full shift of work without an increase in pain MET 2) The paitent will demonstrate pain free lumbar AROM WFL to improve ease getting in and out of a car MET 3) The patient will demonstrate ability to lift 20# from the floor with good body mechanics MET Frequency / Duration: Discharge from therapy RECOMMENDATIONS: 
[x]Discontinue therapy: []Patient has reached or is progressing toward set goals []Patient is non-compliant or has abdicated 
    []Due to lack of appreciable progress towards set goals []Other Terence Carrillo, PT, DPT 9/25/2018 3:52 PM

## 2018-09-27 ENCOUNTER — OFFICE VISIT (OUTPATIENT)
Dept: FAMILY MEDICINE CLINIC | Age: 58
End: 2018-09-27

## 2018-09-27 VITALS
TEMPERATURE: 95.4 F | DIASTOLIC BLOOD PRESSURE: 85 MMHG | BODY MASS INDEX: 35.04 KG/M2 | SYSTOLIC BLOOD PRESSURE: 140 MMHG | HEART RATE: 76 BPM | WEIGHT: 273 LBS | HEIGHT: 74 IN | OXYGEN SATURATION: 98 % | RESPIRATION RATE: 16 BRPM

## 2018-09-27 DIAGNOSIS — Z09 FOLLOW-UP EXAMINATION FOR INJURY: Primary | ICD-10-CM

## 2018-09-27 NOTE — PROGRESS NOTES
HISTORY OF PRESENT ILLNESS  Isaac Jensen is a 62 y.o. male. Patient here for clearance to return to work full duty. He injured his back at work on 8/16/18. He states he was cleared by PT 2 days ago. He denies having any pain today. Visit Vitals    /85    Pulse 76    Temp 95.4 °F (35.2 °C) (Oral)    Resp 16    Ht 6' 2\" (1.88 m)    Wt 273 lb (123.8 kg)    SpO2 98%    BMI 35.05 kg/m2       Follow-up   The history is provided by the patient. Episode onset: injury on 8/16/18. Review of Systems   Musculoskeletal: Negative for back pain. Physical Exam   Constitutional: He is oriented to person, place, and time. He appears well-developed and well-nourished. Musculoskeletal: Normal range of motion. Neurological: He is alert and oriented to person, place, and time. Skin: Skin is warm and dry. Psychiatric: He has a normal mood and affect. ASSESSMENT and PLAN    ICD-10-CM ICD-9-CM    1. Follow-up examination for injury Z09 V67.59      No orders of the defined types were placed in this encounter.   worker's comp form completed  Return to work full duty

## 2018-11-15 NOTE — PROGRESS NOTES
[facility] [address], [address] [place] Phone: [phone]  Fax: [phone] Plan of Care/ Statement of Necessity for Physical Therapy Services 2-15 Patient name: Rachna Whitfield  : 1960  Provider#: [id number] Referral source: Napoleon Kanner, MD     
Medical/Treatment Diagnosis: Low back pain [M54.5] Prior Hospitalization: see medical history Comorbidities: CA, DM, R foot surgery 16, lumbar microdiscectomy , CPAP Prior Level of Function: Pt works full time as a nurse Medications: Verified on Patient Summary List 
 
Start of Care: 18      Onset Date: 18 The Plan of Care and following information is based on the information from the initial evaluation. 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 The patient's condition is complicated by complex PMH noted above including history of low back pain. The patient should continue light duty at work to reduce risk of re-injury over the next 4 weeks. Evaluation Complexity History HIGH Complexity :3+ comorbidities / personal factors will impact the outcome/ POC ; Examination HIGH Complexity : 4+ Standardized tests and measures addressing body structure, function, activity limitation and / or participation in recreation  ;Presentation LOW Complexity : Stable, uncomplicated  ;Clinical Decision Making MEDIUM Complexity : FOTO score of 26-74 Overall Complexity Rating: LOW Problem List: pain affecting function, decrease ROM, decrease strength, impaired gait/ balance, decrease ADL/ functional abilitiies, decrease activity tolerance, decrease flexibility/ joint mobility and decrease transfer abilities Treatment Plan may include any combination of the following: Therapeutic exercise, Neuromuscular re-education, Physical agent/modality, Gait/balance training, Manual therapy, Patient education and Functional mobility training Patient / Family readiness to learn indicated by: asking questions Persons(s) to be included in education: patient (P) Barriers to Learning/Limitations: None Patient Goal (s): relief of pain Patient Self Reported Health Status: fair Rehabilitation Potential: good Short Term Goals: To be accomplished in 4 weeks: 
1) The patient will be independent with introductory HEP 2) The patient will demonstrate lumbar flexion AROM to mid shin to improve ease with dressing 3) The patient will demonstrate lumbar extension AROM to 50% to improve ease with household chores Long Term Goals: To be accomplished in 12 weeks: 
1) The patient will report ability to complete a full shift of work without an increase in pain 2) The paitent will demonstrate pain free lumbar AROM WFL to improve ease getting in and out of a car 3) The patient will demonstrate ability to lift 20# from the floor with good body mechanics Frequency / Duration: Patient to be seen 1 times per week for 12 weeks. Patient/ Caregiver education and instruction: self care, activity modification and exercises 
 
[x]  Plan of care has been reviewed with GREGORIO Chester, PT 8/28/2018 1:57 PM 
 
________________________________________________________________________ I certify that the above Therapy Services are being furnished while the patient is under my care. I agree with the treatment plan and certify that this therapy is necessary. [de-identified] Signature:____________________  Date:____________Time: _________ Clear

## 2019-06-10 ENCOUNTER — HOSPITAL ENCOUNTER (EMERGENCY)
Age: 59
Discharge: HOME | End: 2019-06-10
Attending: EMERGENCY MEDICINE
Payer: COMMERCIAL

## 2019-06-10 VITALS
OXYGEN SATURATION: 95 % | HEART RATE: 96 BPM | TEMPERATURE: 97.6 F | DIASTOLIC BLOOD PRESSURE: 84 MMHG | BODY MASS INDEX: 34.22 KG/M2 | WEIGHT: 275.2 LBS | RESPIRATION RATE: 16 BRPM | HEIGHT: 75 IN | SYSTOLIC BLOOD PRESSURE: 125 MMHG

## 2019-06-10 DIAGNOSIS — B49 FUNGAL INFECTION: Primary | ICD-10-CM

## 2019-06-10 DIAGNOSIS — R21 RASH AND NONSPECIFIC SKIN ERUPTION: ICD-10-CM

## 2019-06-10 LAB
GLUCOSE BLD STRIP.AUTO-MCNC: 239 MG/DL (ref 65–100)
SERVICE CMNT-IMP: ABNORMAL

## 2019-06-10 PROCEDURE — 82962 GLUCOSE BLOOD TEST: CPT

## 2019-06-10 PROCEDURE — 99283 EMERGENCY DEPT VISIT LOW MDM: CPT

## 2019-06-10 NOTE — ED TRIAGE NOTES
Patient arrives to the ED with a  Large red bruise on his left lateral foot, no pain noted, patient is a diabetic,  in triage.

## 2019-06-10 NOTE — ED PROVIDER NOTES
62 yo M with hx of DM, SBO, restless leg, testicular CA, AF, diabetic neuropathy and hyperlipidemia here for evaluation of L foot rash/redness. States he noticed redness to L foot x tonight. No open wounds. No fever. No difficulty ambulating. No joint swelling or pain. 's    Of note pt with tick bite end of May to right waste line. PCP Dr Rey Paniagua. Podiatrist Dr Lewis Vo.     The history is provided by the patient. Foot Swelling    This is a new problem. Past Medical History:   Diagnosis Date    Arrhythmia 04/2016    Paroxysmal Atrial Fibrillation     Cancer [facility]) 1985    Testicular-RIGHT    Cancer ([address]     Basal Cell skin ca.  Diabetes ([facility]     Diabetic neuropathy ([facility] [address] [facility] 1/2/2012    Hyperlipidemia     Low back pain 7/13/2011    Restless legs syndrome 12/23/2011    Shoulder impingement 1/2/2012    Sleep apnea     USES CPAP    Small bowel obstruction, partial ([address] 11/22/2010       Past Surgical History:   Procedure Laterality Date    ENDOSCOPY, COLON, DIAGNOSTIC  5/7/09    colonic polypectomies, f/u 3+ yrs    HX AMPUTATION      right 5th toe    HX APPENDECTOMY      HX COLONOSCOPY      HX ORTHOPAEDIC      lumbar spine surgery    HX OTHER SURGICAL      Spinal Surgery    HX OTHER SURGICAL      orchiectomy    HX UROLOGICAL  1985    orchiectomy for ca. -RIGHT         Family History:   Problem Relation Age of Onset    High Cholesterol Father     Heart Disease Mother         afib    Arthritis-osteo Mother     Asthma Brother     Arthritis-osteo Brother     Anesth Problems Neg Hx        Social History     Socioeconomic History    Marital status: LEGALLY      Spouse name: Not on file    Number of children: Not on file    Years of education: Not on file    Highest education level: Not on file   Occupational History    Not on file   Social Needs    Financial resource strain: Not on file    Food insecurity:     Worry: Not on file     Inability: Not on file   [employer] needs:     Medical: Not on file     Non-medical: Not on file   Tobacco Use    Smoking status: Former Smoker     Last attempt to quit: 11/10/2014     Years since quittin.5    Smokeless tobacco: Never Used    Tobacco comment: RARE SMOKING \"NO REAL SMOKING\"   Substance and Sexual Activity    Alcohol use: Yes     Alcohol/week: 0.0 oz     Comment: rarely    Drug use: No    Sexual activity: Not on file   Lifestyle    Physical activity:     Days per week: Not on file     Minutes per session: Not on file    Stress: Not on file   Relationships    Social connections:     Talks on phone: Not on file     Gets together: Not on file     Attends Jain service: Not on file     Active member of club or organization: Not on file     Attends meetings of clubs or organizations: Not on file     Relationship status: Not on file    Intimate partner violence:     Fear of current or ex partner: Not on file     Emotionally abused: Not on file     Physically abused: Not on file     Forced sexual activity: Not on file   Other Topics Concern    Not on file   Social History Narrative    Not on file         ALLERGIES: Patient has no known allergies. Review of Systems   Constitutional: Negative for activity change and fever. HENT: Negative for facial swelling. Eyes: Negative for discharge. Respiratory: Negative for cough. Cardiovascular: Negative for leg swelling. Gastrointestinal: Negative for abdominal distention. Musculoskeletal: Negative for joint swelling and myalgias. Skin: Positive for color change and rash. Neurological: Negative for seizures and syncope. Psychiatric/Behavioral: Negative for behavioral problems.        Vitals:    06/10/19 0137 06/10/19 0146   BP:  125/84   Pulse: 100 96   Resp:  16   Temp:  97.6 °F (36.4 °C)   SpO2: 98% 95%   Weight:  124.8 kg (275 lb 3.2 oz)   Height:  6' 3\" (1.905 m)            Physical Exam   Constitutional: He is oriented to person, place, and time. He appears well-nourished. No distress. HENT:   Head: Normocephalic and atraumatic. Eyes: Pupils are equal, round, and reactive to light. Neck: Normal range of motion. Cardiovascular: Normal rate and regular rhythm. Pulses:       Dorsalis pedis pulses are 2+ on the left side. Pulmonary/Chest: Effort normal and breath sounds normal.   Abdominal: Soft. There is no tenderness. Musculoskeletal: Normal range of motion. He exhibits no edema or tenderness. Neurological: He is alert and oriented to person, place, and time. Skin: Skin is warm and dry. Rash noted. Circular red rash to left lateral ankle; non tender; no open wounds   Psychiatric: He has a normal mood and affect. Nursing note and vitals reviewed. MDM  Number of Diagnoses or Management Options  Fungal infection:   Rash and nonspecific skin eruption:   Diagnosis management comments: 60 yo M with reddened area to L ankle x today; no warmth; non tender; no open wound; no discomfort; being treated for fungal infection on right foot; area circular in nature; will treat with antifungal and have Podiatry followup; given s/s to return to ER. WANG Koehler         Amount and/or Complexity of Data Reviewed  Discuss the patient with other providers: yes           Procedures        Patient has been reassessed. Ready to discharge home. Discussed case with attending Physician Pranay Augustin; in to see. Agrees with care and will D/C with follow up. Patient's results have been reviewed with them. Patient and/or family have verbally conveyed their understanding and agreement of the patient's signs, symptoms, diagnosis, treatment and prognosis and additionally agree to follow up as recommended or return to the Emergency Room should their condition change prior to follow-up.   Discharge instructions have also been provided to the patient with some educational information regarding their diagnosis as well a list of reasons why they would want to return to the ER prior to their follow-up appointment should their condition change.   Mely Leblanc, PA

## 2019-06-10 NOTE — DISCHARGE INSTRUCTIONS
CONTINUE TO USE ANTIFUNGAL CREAM; FOLLOW UP WITH PODIATRY. Rash: Care Instructions  Your Care Instructions  A rash is any irritation or inflammation of the skin. Rashes have many possible causes, including allergy, infection, illness, heat, and emotional stress. Follow-up care is a key part of your treatment and safety. Be sure to make and go to all appointments, and call your doctor if you are having problems. It's also a good idea to know your test results and keep a list of the medicines you take. How can you care for yourself at home? · Wash the area with water only. Soap can make dryness and itching worse. Pat dry. · Put cold, wet cloths on the rash to reduce itching. · Keep cool, and stay out of the sun. · Leave the rash open to the air as much of the time as possible. · Sometimes petroleum jelly (Vaseline) can help relieve the discomfort caused by a rash. A moisturizing lotion, such as Cetaphil, also may help. Calamine lotion may help for rashes caused by contact with something (such as a plant or soap) that irritated the skin. Use it 3 or 4 times a day. · If your doctor prescribed a cream, use it as directed. If your doctor prescribed medicine, take it exactly as directed. · If your rash itches so badly that it interferes with your normal activities, take an over-the-counter antihistamine, such as diphenhydramine (Benadryl) or loratadine (Claritin). Read and follow all instructions on the label. When should you call for help? Call your doctor now or seek immediate medical care if:    · You have signs of infection, such as:  ? Increased pain, swelling, warmth, or redness. ? Red streaks leading from the area. ? Pus draining from the area. ? A fever.     · You have joint pain along with the rash.    Watch closely for changes in your health, and be sure to contact your doctor if:    · Your rash is changing or getting worse.  For example, call if you have pain along with the rash, the rash is spreading, or you have new blisters.     · You do not get better after 1 week. Where can you learn more? Go to http://kat-angel.info/. Enter V632 in the search box to learn more about \"Rash: Care Instructions. \"  Current as of: April 17, 2018  Content Version: 11.9  © 8008-3434 [employer]. Care instructions adapted under license by [employer] (which disclaims liability or warranty for this information). If you have questions about a medical condition or this instruction, always ask your healthcare professional. [place] [address] any warranty or liability for your use of this information.   Patient Education

## 2019-06-11 ENCOUNTER — PATIENT OUTREACH (OUTPATIENT)
Dept: OTHER | Age: 59
End: 2019-06-11

## 2019-06-11 NOTE — PROGRESS NOTES
[facility] Outreach      Patient on report as eligible for Case Management. Left discreet message on voicemail with this CM contact information. Will attempt to contact again to offer [address] [place] [address] Management services. * IDDM with ED visit 6/10 for L foot infection (rash). A1C found as 9.5 4/22/19. Podiatrist Dr Lexa Benz        Chart Review:      60 yo M with hx of DM, SBO, restless leg, testicular CA, AF, diabetic neuropathy and hyperlipidemia here for evaluation of L foot rash/redness. States he noticed redness to L foot x tonight. No open wounds. No fever. No difficulty ambulating. No joint swelling or pain. 's     Of note pt with tick bite end of May to right waste line.       PCP Dr Susan Harvey. Podiatrist Dr Cony Mi:     06/10/19 0137 06/10/19 0146   BP:   125/84   Pulse: 100 96   Resp:   16   Temp:   97.6 °F (36.4 °C)   SpO2: 98% 95%   Weight:   124.8 kg (275 lb 3.2 oz)   Height:   6' 3\" (1.905 m)          Skin: Skin is warm and dry. Rash noted. Circular red rash to left lateral ankle; non tender; no open wounds     Fungal infection:   Rash and nonspecific skin eruption:   Diagnosis management comments: 60 yo M with reddened area to L ankle x today; no warmth; non tender; no open wound; no discomfort; being treated for fungal infection on right foot; area circular in nature; will treat with antifungal and have Podiatry followup; given s/s to return to ER.  WANG Purvis    Glucose (POC) 239     ED Prescriptions     None   Follow-up Information     Follow up With Specialties Details Why Contact Info   Tera Tovar MD Internal Medicine   [address] [place] [address] [phone]    Malina Banuelos DPM Podiatry Schedule an appointment as soon as possible for a visit  [address]   [address]   [phone]          PCP apt / Dr. Susan Harvey 4/22/19    Visit Vitals  /60   Ht 6' 2\" (1.88 m)   Wt 270 lb (122.5 kg) BMI 34.67 kg/m²      Feet r>L athlete's foot       METABOLIC PANEL, COMPREHENSIVE   Result Value Ref Range     Glucose 225 (H) 65 - 99 mg/dL     BUN 17 6 - 24 mg/dL     Creatinine 1.06 0.76 - 1.27 mg/dL     GFR est non-AA 77 >59 mL/min/1.73       HEMOGLOBIN A1C W/O EAG   Result Value Ref Range     Hemoglobin A1c 9.2 (H) 4.8 - 5.6 %         DM. Increase basal insulin to 50 units      Low abd pain. Check labs . See Dr. Hi Kent prn      HTN well controlled. Same rx      Trigger finger and probable OA.  He prefers conservative mgmt  Follow for now      Follow up in 5 mos for cpe  or PRN.     Author:  Emerald Carpenter MD 11:32 AM4/22/2019               Specific Gravity      >=1.030  Abnormal   1.005 - 1.030  Final   pH (UA) 5.5   5.0 - 7.5  Final   Color Yellow   Yellow  Final   Appearance Clear   Clear  Final   Leukocyte Esterase Negative   Negative  Final   Protein Negative   Negative/Trace  Final   Glucose 3+  Abnormal   Negative  Final   Ketone Negative   Negative  Final   Blood Negative   Negative  Final   Bilirubin Negative   Negative  Final   Urobilinogen 1.0   0.2 - 1.0 mg/dL Final   Nitrites Negative   Negative

## 2019-06-14 ENCOUNTER — PATIENT OUTREACH (OUTPATIENT)
Dept: OTHER | Age: 59
End: 2019-06-14

## 2019-06-14 NOTE — LETTER
6/14/2019 12:01 PM 
 
Mr. Ambrosio Doll [address] [facility] Dear Mr. Sarah Chaves, My name is Jennifer Breaux, Employee Care Manager for [employer] and I have been trying to reach you. The Employee Care Management [facility]) program is a free-of-charge confidential service provided to our employees and their family members covered by the [facility]. The program will provide an employee and his/her family with the [employer] expertise to assist in navigating the health care delivery system, provider services, and their overall care needsso as to assure and improve health care interactions and enhance the quality of life. This program is designed to provide you with the opportunity to have a [employer] care manager partner with you for the following services: 
 
 1) when you come home from the hospital or emergency room 2) when help is needed to manage your disease 3) when you need assistance coordinating services or appointments [employer] now partners with [facility]. If you are a qualifying employee, you may receive an additional 10 wellness incentive points for every month of active participation with an Employee Care Manager. [employer] is dedicated to empowering the good health of its community and improving the quality of care and care experiences for employees and their families. We are committed to safeguarding patient confidentiality and privacy, assuring that every employee has the respect he or she deserves in managing their health. The information shared with your care manager will not be shared with anyone else aside from those you identify as part of your care team, and will only be used to assist you with any identified care needs. Please contact me if you would like this service provided to you. Sincerely, 
 
 
 
Jennifer Breaux RN 
Jennifer Breaux RN, MS, [address] [facility] [address] [facility]       Phone:  [phone]     Fax:  [phone]    [email or web address]

## 2019-06-14 NOTE — PROGRESS NOTES
[facility] Outreach       Patient identified as eligible for [address] [place] [address] services. Second telephone outreach attempted. Left discreet voicemail with this CM confidential contact information. * Will send UTR letter. * IDDM with ED visit 6/10 for L foot infection (rash). A1C found as 9.5 4/22/19. Podiatrist Dr Lexa Benz    Follow for one month for potential patient engagement.

## 2019-07-15 ENCOUNTER — PATIENT OUTREACH (OUTPATIENT)
Dept: OTHER | Age: 59
End: 2019-07-15

## 2019-07-15 ENCOUNTER — OFFICE VISIT (OUTPATIENT)
Dept: SLEEP MEDICINE | Age: 59
End: 2019-07-15

## 2019-07-15 VITALS
WEIGHT: 275 LBS | DIASTOLIC BLOOD PRESSURE: 86 MMHG | BODY MASS INDEX: 34.19 KG/M2 | HEIGHT: 75 IN | HEART RATE: 78 BPM | OXYGEN SATURATION: 98 % | SYSTOLIC BLOOD PRESSURE: 136 MMHG

## 2019-07-15 DIAGNOSIS — G47.31 COMPLEX SLEEP APNEA SYNDROME: Primary | ICD-10-CM

## 2019-07-15 NOTE — PROGRESS NOTES
[address] [place]. [place], [address]  Tel.  [phone]  Fax. [address] [facility] [address]  [place], [address]  Tel.  [phone]  Fax. [phone] [address] [facility] [address]   Tel.  [phone]  Fax. [phone]     S>Valentin Sanders is a 61 y.o. male seen for a positive airway pressure follow-up. He reports no problems using the device. He is 66.7% compliant over the past 30 days. The following problems are identified:    Drowsiness no Problems exhaling no   Snoring no Forget to put on no   Mask Comfortable yes Can't fall asleep no   Dry Mouth no Mask falls off no   Air Leaking no Frequent awakenings no         He admits that his sleep has improved on PAP therapy using FF mask and non-heated tubing. No Known Allergies    He has a current medication list which includes the following prescription(s): insulin glargine, metformin, simvastatin, bd ultra-fine short pen needle, cyclobenzaprine, insulin needles (disposable), glucose blood vi test strips, sotalol, ibuprofen, ropinirole, glipizide, glipizide, ropinirole, tramadol, and tramadol. .      He  has a past medical history of Arrhythmia (04/2016), Cancer ([facility] (1985), Cancer ([facility] Diabetes ([facility] Diabetic neuropathy ([facility] (1/2/2012), Hyperlipidemia, Low back pain (7/13/2011), Restless legs syndrome (12/23/2011), Shoulder impingement (1/2/2012), Sleep apnea, and Small bowel obstruction, partial ([facility] (11/22/2010). [place] Sleepiness Score: 6   and Modified F.O.S.Q. Score Total / 2: 19.5   which reflect improved sleep quality over therapy time.     O>    Visit Vitals  /86   Pulse 78   Ht 6' 3\" (1.905 m)   Wt 275 lb (124.7 kg)   SpO2 98%   BMI 34.37 kg/m²         General:   Not in acute distress   Eyes:  Anicteric sclerae, no obvious strabismus   Nose:  No obvious nasal septum deviation    Oropharynx:   Class 4 oropharyngeal outlet, thick tongue base, uvula not seen due to low-lying soft palate, narrow tonsilo-pharyngeal pilars   Tonsils:   tonsils are not visualized due to low-lying soft palate   Neck:   midline trachea   Chest/Lungs:  Equal lung expansion, clear on auscultation    CVS:  Normal rate, regular rhythm; no JVD   Skin:  Warm to touch; no obvious rashes   Neuro:  No focal deficits ; no obvious tremor    Psych:  Normal affect,  normal countenance;           A>    ICD-10-CM ICD-9-CM    1. Complex sleep apnea syndrome G47.31 327.21 SLEEP LAB (PAP TITRATION)      AMB SUPPLY ORDER   2. BMI 34.0-34.9,adult Z68.34 V85.34      AHI = 84.7 (2015). On Bi - Level S/T with AVAPS : Tidal Volume 500 ml, Breath Rate 12, Max IPAP 20, Min IPAP 12, EPAP 9 cmH2O. Compliant:      yes    Therapeutic Response:  Positive    P>    * Patient agrees to re-testing due to residual AHI on download. * We have recommended a dedicated weight loss through appropriate diet and an exercise regiment as significant weight reduction has been shown to reduce severity of obstructive sleep apnea. *   Follow-up and Dispositions    · Return in about 1 year (around 7/15/2020), or if symptoms worsen or fail to improve. * He was asked to contact our office for any problems regarding PAP therapy. * Counseling was provided regarding the importance of regular PAP use and on proper sleep hygiene and safe driving. * Re-enforced proper and regular cleaning for the device. Thank you for allowing us to participate in your patient's medical care. Skinny Pratt MD, FAASM  Electronically signed.  07/15/19

## 2019-07-15 NOTE — PATIENT INSTRUCTIONS
[facility] [place]. [place], [address]  Tel.  [phone]  Fax. 100 [facility] [address]  [place], [address] [facility]  Tel.  [phone]  Fax. [phone] [address]  Tel.  [phone]  Fax. [phone]     Learning About CPAP for Sleep Apnea  What is CPAP? CPAP is a small machine that you use at home every night while you sleep. It increases air pressure in your throat to keep your airway open. When you have sleep apnea, this can help you sleep better so you feel much better. CPAP stands for \"continuous positive airway pressure. \"  The CPAP machine will have one of the following:  · A mask that covers your nose and mouth  · Prongs that fit into your nose  · A mask that covers your nose only, the most common type. This type is called NCPAP. The N stands for \"nasal.\"  Why is it done? CPAP is usually the best treatment for obstructive sleep apnea. It is the first treatment choice and the most widely used. Your doctor may suggest CPAP if you have:  · Moderate to severe sleep apnea. · Sleep apnea and coronary artery disease (CAD) or heart failure. How does it help? · CPAP can help you have more normal sleep, so you feel less sleepy and more alert during the daytime. · CPAP may help keep heart failure or other heart problems from getting worse. · NCPAP may help lower your blood pressure. · If you use CPAP, your bed partner may also sleep better because you are not snoring or restless. What are the side effects? Some people who use CPAP have:  · A dry or stuffy nose and a sore throat. · Irritated skin on the face. · Sore eyes. · Bloating. If you have any of these problems, work with your doctor to fix them. Here are some things you can try:  · Be sure the mask or nasal prongs fit well. · See if your doctor can adjust the pressure of your CPAP. · If your nose is dry, try a humidifier.   · If your nose is runny or stuffy, try decongestant medicine or a steroid nasal spray. If these things do not help, you might try a different type of machine. Some machines have air pressure that adjusts on its own. Others have air pressures that are different when you breathe in than when you breathe out. This may reduce discomfort caused by too much pressure in your nose. Where can you learn more? Go to Ad.IQ.be  Enter Moise Lr in the search box to learn more about \"Learning About CPAP for Sleep Apnea. \"   © 5870-2679 Healthwise, Incorporated. Care instructions adapted under license by [facility] (which disclaims liability or warranty for this information). This care instruction is for use with your licensed healthcare professional. If you have questions about a medical condition or this instruction, always ask your healthcare professional. [address] any warranty or liability for your use of this information. Content Version: 5.3.14396; Last Revised: January 11, 2010  PROPER SLEEP HYGIENE    What to avoid  · Do not have drinks with caffeine, such as coffee or black tea, for 8 hours before bed. · Do not smoke or use other types of tobacco near bedtime. Nicotine is a stimulant and can keep you awake. · Avoid drinking alcohol late in the evening, because it can cause you to wake in the middle of the night. · Do not eat a big meal close to bedtime. If you are hungry, eat a light snack. · Do not drink a lot of water close to bedtime, because the need to urinate may wake you up during the night. · Do not read or watch TV in bed. Use the bed only for sleeping and sexual activity. What to try  · Go to bed at the same time every night, and wake up at the same time every morning. Do not take naps during the day. · Keep your bedroom quiet, dark, and cool. · Get regular exercise, but not within 3 to 4 hours of your bedtime. .  · Sleep on a comfortable pillow and mattress.   · If watching the clock makes you anxious, turn it facing away from you so you cannot see the time. · If you worry when you lie down, start a worry book. Well before bedtime, write down your worries, and then set the book and your concerns aside. · Try meditation or other relaxation techniques before you go to bed. · If you cannot fall asleep, get up and go to another room until you feel sleepy. Do something relaxing. Repeat your bedtime routine before you go to bed again. · Make your house quiet and calm about an hour before bedtime. Turn down the lights, turn off the TV, log off the computer, and turn down the volume on music. This can help you relax after a busy day. Drowsy Driving: The [employer] cites drowsiness as a causing factor in more than 514,728 police reported crashes annually, resulting in 76,000 injuries and 1,500 deaths. Other surveys suggest 55% of people polled have driven while drowsy in the past year, 23% had fallen asleep but not crashed, 3% crashed, and 2% had and accident due to drowsy driving. Who is at risk? Young Drivers: One study of drowsy driving accidents states that 55% of the drivers were under 25 years. Of those, 75% were male. Shift Workers and Travelers: People who work overnight or travel across time zones frequently are at higher risk of experiencing Circadian Rhythm Disorders. They are trying to work and function when their body is programed to sleep. Sleep Deprived: Lack of sleep has a serious impact on your ability to pay attention or focus on a task. Consistently getting less than the average of 8 hours your body needs creates partial or cumulative sleep deprivation. Untreated Sleep Disorders: Sleep Apnea, Narcolepsy, R.L.S., and other sleep disorders (untreated) prevent a person from getting enough restful sleep. This leads to excessive daytime sleepiness and increases the risk for drowsy driving accidents by up to 7 times.   Medications / Alcohol: Even over the counter medications can cause drowsiness. Medications that impair a drivers attention should have a warning label. Alcohol naturally makes you sleepy and on its own can cause accidents. Combined with excessive drowsiness its effects are amplified. Signs of Drowsy Driving:   * You don't remember driving the last few miles   * You may drift out of your magno   * You are unable to focus and your thoughts wander   * You may yawn more often than normal   * You have difficulty keeping your eyes open / nodding off   * Missing traffic signs, speeding, or tailgating  Prevention-   Good sleep hygiene, lifestyle and behavioral choices have the most impact on drowsy driving. There is no substitute for sleep and the average person requires 8 hours nightly. If you find yourself driving drowsy, stop and sleep. Consider the sleep hygiene tips provided during your visit as well. Medication Refill Policy: Refills for all medications require 1 week advance notice. Please have your pharmacy fax a refill request. We are unable to fax, or call in \"controled substance\" medications and you will need to pick these prescriptions up from our office. [employer] Activation    Thank you for requesting access to [employer]. Please follow the instructions below to securely access and download your online medical record. [employer] allows you to send messages to your doctor, view your test results, renew your prescriptions, schedule appointments, and more. How Do I Sign Up? 1. In your internet browser, go to [email or web address]. 2. Click on the First Time User? Click Here link in the Sign In box. You will see the New Member Sign Up page. 3. Enter your [employer] Access Code exactly as it appears below. You will not need to use this code after youve completed the sign-up process. If you do not sign up before the expiration date, you must request a new code. [employer] Access Code:  Activation code not generated  Current [employer] Status: Active (This is the date your [employer] access code will )    4. Enter the last four digits of your Social Security Number (xxxx) and Date of Birth (mm/dd/yyyy) as indicated and click Submit. You will be taken to the next sign-up page. 5. Create a [employer] ID. This will be your [employer] login ID and cannot be changed, so think of one that is secure and easy to remember. 6. Create a [employer] password. You can change your password at any time. 7. Enter your Password Reset Question and Answer. This can be used at a later time if you forget your password. 8. Enter your e-mail address. You will receive e-mail notification when new information is available in [address] 9. Click Sign Up. You can now view and download portions of your medical record. 10. Click the Download Summary menu link to download a portable copy of your medical information. Additional Information    If you have questions, please call [phone]. Remember, [employer] is NOT to be used for urgent needs. For medical emergencies, dial 911.

## 2019-07-16 ENCOUNTER — DOCUMENTATION ONLY (OUTPATIENT)
Dept: SLEEP MEDICINE | Age: 59
End: 2019-07-16

## 2019-07-30 ENCOUNTER — HOSPITAL ENCOUNTER (OUTPATIENT)
Dept: SLEEP MEDICINE | Age: 59
Discharge: HOME | End: 2019-07-30
Payer: COMMERCIAL

## 2019-07-30 VITALS
WEIGHT: 278.1 LBS | SYSTOLIC BLOOD PRESSURE: 117 MMHG | DIASTOLIC BLOOD PRESSURE: 61 MMHG | HEIGHT: 76 IN | OXYGEN SATURATION: 97 % | TEMPERATURE: 98.3 F | HEART RATE: 72 BPM | BODY MASS INDEX: 33.86 KG/M2

## 2019-07-30 DIAGNOSIS — G47.31 COMPLEX SLEEP APNEA SYNDROME: ICD-10-CM

## 2019-07-30 PROCEDURE — 95811 POLYSOM 6/>YRS CPAP 4/> PARM: CPT | Performed by: INTERNAL MEDICINE

## 2019-08-02 ENCOUNTER — TELEPHONE (OUTPATIENT)
Dept: SLEEP MEDICINE | Age: 59
End: 2019-08-02

## 2019-08-09 NOTE — TELEPHONE ENCOUNTER
Titration study interpreted. * Device pressure change to Bi-Level S/T with AVAPS titration: -   Max pressure: 30 cmH2O;   Minimum EPAP: 6 cmH2O; Minimum IPAP: 15; Tidal Volume: Starting 600 ml, Rate: 9.     by lead technologist either remotely or at PAP clinic (notify patient).     * PAP card download in 4 weeks.     * Office visit in 6 months or as needed.

## 2019-08-15 ENCOUNTER — PATIENT MESSAGE (OUTPATIENT)
Dept: SLEEP MEDICINE | Age: 59
End: 2019-08-15

## 2019-08-19 RX ORDER — INSULIN GLARGINE 100 [IU]/ML
INJECTION, SOLUTION SUBCUTANEOUS
Qty: 45 ADJUSTABLE DOSE PRE-FILLED PEN SYRINGE | Refills: 3 | Status: SHIPPED | OUTPATIENT
Start: 2019-08-19 | End: 2020-09-21

## 2020-03-06 ENCOUNTER — TELEPHONE (OUTPATIENT)
Dept: PHARMACY | Age: 60
End: 2020-03-06

## 2020-03-06 NOTE — TELEPHONE ENCOUNTER
Unable to reach patient by phone  Attempting to reach patient to discuss signing up for the DM Program. Left message asking for return call to toll free [phone] option 7.     [facility], toll free: [phone], option 7

## 2020-10-06 ENCOUNTER — APPOINTMENT (OUTPATIENT)
Dept: CT IMAGING | Age: 60
End: 2020-10-06
Attending: EMERGENCY MEDICINE
Payer: COMMERCIAL

## 2020-10-06 ENCOUNTER — APPOINTMENT (OUTPATIENT)
Dept: GENERAL RADIOLOGY | Age: 60
End: 2020-10-06
Attending: EMERGENCY MEDICINE
Payer: COMMERCIAL

## 2020-10-06 ENCOUNTER — HOSPITAL ENCOUNTER (EMERGENCY)
Age: 60
Discharge: HOME | End: 2020-10-06
Attending: EMERGENCY MEDICINE | Admitting: EMERGENCY MEDICINE
Payer: COMMERCIAL

## 2020-10-06 VITALS
HEIGHT: 75 IN | HEART RATE: 97 BPM | TEMPERATURE: 97.7 F | OXYGEN SATURATION: 98 % | DIASTOLIC BLOOD PRESSURE: 88 MMHG | WEIGHT: 260 LBS | SYSTOLIC BLOOD PRESSURE: 145 MMHG | RESPIRATION RATE: 16 BRPM | BODY MASS INDEX: 32.33 KG/M2

## 2020-10-06 DIAGNOSIS — R11.0 NAUSEA WITHOUT VOMITING: ICD-10-CM

## 2020-10-06 DIAGNOSIS — R19.7 DIARRHEA, UNSPECIFIED TYPE: ICD-10-CM

## 2020-10-06 DIAGNOSIS — R10.31 ABDOMINAL PAIN, RIGHT LOWER QUADRANT: ICD-10-CM

## 2020-10-06 DIAGNOSIS — R10.32 ABDOMINAL PAIN, LLQ (LEFT LOWER QUADRANT): Primary | ICD-10-CM

## 2020-10-06 LAB
ALBUMIN SERPL-MCNC: 3.8 G/DL (ref 3.5–5)
ALBUMIN/GLOB SERPL: 1.1 {RATIO} (ref 1.1–2.2)
ALP SERPL-CCNC: 81 U/L (ref 45–117)
ALT SERPL-CCNC: 35 U/L (ref 12–78)
ANION GAP SERPL CALC-SCNC: 6 MMOL/L (ref 5–15)
AST SERPL-CCNC: 19 U/L (ref 15–37)
BASOPHILS # BLD: 0.1 K/UL (ref 0–0.1)
BASOPHILS NFR BLD: 1 % (ref 0–1)
BILIRUB SERPL-MCNC: 0.6 MG/DL (ref 0.2–1)
BUN SERPL-MCNC: 11 MG/DL (ref 6–20)
BUN/CREAT SERPL: 14 (ref 12–20)
CALCIUM SERPL-MCNC: 8.8 MG/DL (ref 8.5–10.1)
CHLORIDE SERPL-SCNC: 105 MMOL/L (ref 97–108)
CO2 SERPL-SCNC: 24 MMOL/L (ref 21–32)
COMMENT, HOLDF: NORMAL
CREAT SERPL-MCNC: 0.79 MG/DL (ref 0.7–1.3)
DIFFERENTIAL METHOD BLD: ABNORMAL
EOSINOPHIL # BLD: 0.1 K/UL (ref 0–0.4)
EOSINOPHIL NFR BLD: 1 % (ref 0–7)
ERYTHROCYTE [DISTWIDTH] IN BLOOD BY AUTOMATED COUNT: 14.3 % (ref 11.5–14.5)
GLOBULIN SER CALC-MCNC: 3.5 G/DL (ref 2–4)
GLUCOSE SERPL-MCNC: 177 MG/DL (ref 65–100)
HCT VFR BLD AUTO: 45.4 % (ref 36.6–50.3)
HGB BLD-MCNC: 15.4 G/DL (ref 12.1–17)
IMM GRANULOCYTES # BLD AUTO: 0.1 K/UL (ref 0–0.04)
IMM GRANULOCYTES NFR BLD AUTO: 1 % (ref 0–0.5)
LIPASE SERPL-CCNC: 91 U/L (ref 73–393)
LYMPHOCYTES # BLD: 1.4 K/UL (ref 0.8–3.5)
LYMPHOCYTES NFR BLD: 20 % (ref 12–49)
MCH RBC QN AUTO: 28.6 PG (ref 26–34)
MCHC RBC AUTO-ENTMCNC: 33.9 G/DL (ref 30–36.5)
MCV RBC AUTO: 84.2 FL (ref 80–99)
MONOCYTES # BLD: 0.4 K/UL (ref 0–1)
MONOCYTES NFR BLD: 6 % (ref 5–13)
NEUTS SEG # BLD: 4.9 K/UL (ref 1.8–8)
NEUTS SEG NFR BLD: 71 % (ref 32–75)
NRBC # BLD: 0 K/UL (ref 0–0.01)
NRBC BLD-RTO: 0 PER 100 WBC
PLATELET # BLD AUTO: 148 K/UL (ref 150–400)
PMV BLD AUTO: 9.4 FL (ref 8.9–12.9)
POTASSIUM SERPL-SCNC: 3.9 MMOL/L (ref 3.5–5.1)
PROT SERPL-MCNC: 7.3 G/DL (ref 6.4–8.2)
RBC # BLD AUTO: 5.39 M/UL (ref 4.1–5.7)
SAMPLES BEING HELD,HOLD: NORMAL
SODIUM SERPL-SCNC: 135 MMOL/L (ref 136–145)
WBC # BLD AUTO: 7 K/UL (ref 4.1–11.1)

## 2020-10-06 PROCEDURE — 2709999900 HC NON-CHARGEABLE SUPPLY

## 2020-10-06 PROCEDURE — 96374 THER/PROPH/DIAG INJ IV PUSH: CPT

## 2020-10-06 PROCEDURE — 80053 COMPREHEN METABOLIC PANEL: CPT

## 2020-10-06 PROCEDURE — 85025 COMPLETE CBC W/AUTO DIFF WBC: CPT

## 2020-10-06 PROCEDURE — 74177 CT ABD & PELVIS W/CONTRAST: CPT

## 2020-10-06 PROCEDURE — 83690 ASSAY OF LIPASE: CPT

## 2020-10-06 PROCEDURE — 74011000258 HC RX REV CODE- 258: Performed by: RADIOLOGY

## 2020-10-06 PROCEDURE — 36415 COLL VENOUS BLD VENIPUNCTURE: CPT

## 2020-10-06 PROCEDURE — 74022 RADEX COMPL AQT ABD SERIES: CPT

## 2020-10-06 PROCEDURE — 74011250637 HC RX REV CODE- 250/637: Performed by: EMERGENCY MEDICINE

## 2020-10-06 PROCEDURE — 74011000636 HC RX REV CODE- 636: Performed by: RADIOLOGY

## 2020-10-06 PROCEDURE — 96361 HYDRATE IV INFUSION ADD-ON: CPT

## 2020-10-06 PROCEDURE — 99283 EMERGENCY DEPT VISIT LOW MDM: CPT

## 2020-10-06 PROCEDURE — 74011250636 HC RX REV CODE- 250/636: Performed by: EMERGENCY MEDICINE

## 2020-10-06 RX ORDER — DICYCLOMINE HYDROCHLORIDE 10 MG/1
40 CAPSULE ORAL ONCE
Status: DISCONTINUED | OUTPATIENT
Start: 2020-10-06 | End: 2020-10-07 | Stop reason: HOSPADM

## 2020-10-06 RX ORDER — SODIUM CHLORIDE 0.9 % (FLUSH) 0.9 %
10 SYRINGE (ML) INJECTION
Status: COMPLETED | OUTPATIENT
Start: 2020-10-06 | End: 2020-10-06

## 2020-10-06 RX ORDER — PROCHLORPERAZINE MALEATE 5 MG
10 TABLET ORAL
Status: COMPLETED | OUTPATIENT
Start: 2020-10-06 | End: 2020-10-06

## 2020-10-06 RX ORDER — PROCHLORPERAZINE MALEATE 5 MG
5 TABLET ORAL
Qty: 12 TAB | Refills: 0 | Status: SHIPPED | OUTPATIENT
Start: 2020-10-06 | End: 2020-10-13

## 2020-10-06 RX ORDER — KETOROLAC TROMETHAMINE 30 MG/ML
30 INJECTION, SOLUTION INTRAMUSCULAR; INTRAVENOUS
Status: COMPLETED | OUTPATIENT
Start: 2020-10-06 | End: 2020-10-06

## 2020-10-06 RX ORDER — DICYCLOMINE HYDROCHLORIDE 10 MG/1
20 CAPSULE ORAL 4 TIMES DAILY
Qty: 40 CAP | Refills: 0 | Status: SHIPPED | OUTPATIENT
Start: 2020-10-06 | End: 2020-10-11

## 2020-10-06 RX ORDER — DICYCLOMINE HYDROCHLORIDE 10 MG/1
40 CAPSULE ORAL
Status: COMPLETED | OUTPATIENT
Start: 2020-10-06 | End: 2020-10-06

## 2020-10-06 RX ORDER — ACETAMINOPHEN 500 MG
1000 TABLET ORAL 3 TIMES DAILY
Qty: 24 TAB | Refills: 0 | Status: SHIPPED | OUTPATIENT
Start: 2020-10-06 | End: 2020-10-10

## 2020-10-06 RX ADMIN — SODIUM CHLORIDE 100 ML: 900 INJECTION, SOLUTION INTRAVENOUS at 21:43

## 2020-10-06 RX ADMIN — Medication 10 ML: at 21:43

## 2020-10-06 RX ADMIN — IOPAMIDOL 100 ML: 755 INJECTION, SOLUTION INTRAVENOUS at 21:43

## 2020-10-06 RX ADMIN — DICYCLOMINE HYDROCHLORIDE 40 MG: 10 CAPSULE ORAL at 23:03

## 2020-10-06 RX ADMIN — KETOROLAC TROMETHAMINE 30 MG: 30 INJECTION, SOLUTION INTRAMUSCULAR at 22:58

## 2020-10-06 RX ADMIN — PROCHLORPERAZINE MALEATE 10 MG: 5 TABLET ORAL at 22:58

## 2020-10-07 ENCOUNTER — PATIENT OUTREACH (OUTPATIENT)
Dept: OTHER | Age: 60
End: 2020-10-07

## 2020-10-07 NOTE — PROGRESS NOTES
Patient on report as eligible for Case Management. Left discreet message on voicemail with this CM contact information. Will attempt to contact again to offer [address] [place] [address] Management services. Will reach out again on 10/8. Patient has an appointment with his PCP on 12/1.

## 2020-10-07 NOTE — ED TRIAGE NOTES
Triage:  Pt to the ED due to concerns over worsening abd pain, nausea, and GI issues. Pt states has been noting these symptoms for the last month but recently these complaints have become more noted and frequent.

## 2020-10-07 NOTE — ED NOTES
Pt provided d.c info and prescriptions. No questions asked. NAD, vitals and pain stable, A+Ox4. Ambulatory with even and steady gait independently.  Aware to follow up with gastro and pcp

## 2020-10-07 NOTE — ED PROVIDER NOTES
44-year-old male with significant past medical history of testicular cancer as a child, proximal atrial fibrillation, diabetes, hypertension, partial bowel obstruction presenting to the ER with lower abdominal pain. Patient reports he has been having intermittent lower abdominal pain associate with diarrhea for the last month however last couple days his symptoms have worsened. Reports nausea without vomiting. Denies any blood or mucus in the diarrhea no suspicious foods. Symptoms come and go without any specific exacerbating factors. Patient reports food does not worsen his symptoms. And nothing seems to improve it what is happening however patient has not taken any medications for pain. Patient reports that he was previously told to get a GI doctor however has not done so has had a colonoscopy many years ago. Past Medical History:   Diagnosis Date    Arrhythmia 04/2016    Paroxysmal Atrial Fibrillation     [facility]) 1985    Testicular-RIGHT    Cancer ([address]     Basal Cell skin ca.  Diabetes ([address]     Diabetic neuropathy ([address] 1/2/2012    Hyperlipidemia     Low back pain 7/13/2011    Restless legs syndrome 12/23/2011    Shoulder impingement 1/2/2012    Sleep apnea     USES CPAP    Small bowel obstruction, partial ([address] 11/22/2010       Past Surgical History:   Procedure Laterality Date    ENDOSCOPY, COLON, DIAGNOSTIC  5/7/09    colonic polypectomies, f/u 3+ yrs    HX AMPUTATION      right 5th toe    HX APPENDECTOMY      HX COLONOSCOPY      HX ORTHOPAEDIC      lumbar spine surgery    HX OTHER SURGICAL      Spinal Surgery    HX OTHER SURGICAL      orchiectomy    HX UROLOGICAL  1985    orchiectomy for ca. -RIGHT         Family History:   Problem Relation Age of Onset    High Cholesterol Father     Heart Disease Mother         afib    Arthritis-osteo Mother     Asthma Brother     Arthritis-osteo Brother     Anesth Problems Neg Hx        Social History     Socioeconomic History    Marital status: LEGALLY      Spouse name: Not on file    Number of children: Not on file    Years of education: Not on file    Highest education level: Not on file   Occupational History    Not on file   Social Needs    Financial resource strain: Not on file    Food insecurity     Worry: Not on file     Inability: Not on file    Transportation needs     Medical: Not on file     Non-medical: Not on file   Tobacco Use    Smoking status: Former Smoker     Last attempt to quit: 11/10/2014     Years since quittin.9    Smokeless tobacco: Never Used   Substance and Sexual Activity    Alcohol use: Yes     Alcohol/week: 0.0 standard drinks     Comment: rarely    Drug use: No    Sexual activity: Not on file   Lifestyle    Physical activity     Days per week: Not on file     Minutes per session: Not on file    Stress: Not on file   Relationships    Social connections     Talks on phone: Not on file     Gets together: Not on file     Attends Congregational service: Not on file     Active member of club or organization: Not on file     Attends meetings of clubs or organizations: Not on file     Relationship status: Not on file    Intimate partner violence     Fear of current or ex partner: Not on file     Emotionally abused: Not on file     Physically abused: Not on file     Forced sexual activity: Not on file   Other Topics Concern    Not on file   Social History Narrative    Not on file         ALLERGIES: Patient has no known allergies. Review of Systems   Constitutional: Negative for chills and fever. HENT: Negative for congestion and sore throat. Eyes: Negative for pain. Respiratory: Negative for shortness of breath. Cardiovascular: Negative for chest pain. Gastrointestinal: Positive for abdominal pain, diarrhea and nausea. Negative for vomiting. Genitourinary: Negative for dysuria and flank pain. Musculoskeletal: Negative for back pain and neck pain.    Skin: Negative for rash. Neurological: Negative for dizziness and headaches. Vitals:    10/06/20 2017   BP: (!) 145/88   Pulse: 97   Resp: 22   Temp: 97.7 °F (36.5 °C)   SpO2: 97%   Weight: 117.9 kg (260 lb)   Height: 6' 3\" (1.905 m)            Physical Exam  Constitutional:       Appearance: He is well-developed. HENT:      Head: Normocephalic. Eyes:      Conjunctiva/sclera: Conjunctivae normal.   Neck:      Musculoskeletal: Normal range of motion and neck supple. Cardiovascular:      Rate and Rhythm: Normal rate and regular rhythm. Pulmonary:      Effort: Pulmonary effort is normal. No respiratory distress. Breath sounds: Normal breath sounds. Abdominal:      General: Abdomen is flat. Bowel sounds are normal. There is no distension. Palpations: Abdomen is soft. Tenderness: There is abdominal tenderness in the right lower quadrant, suprapubic area and left lower quadrant. Hernia: No hernia is present. Musculoskeletal: Normal range of motion. Skin:     General: Skin is warm. Capillary Refill: Capillary refill takes less than 2 seconds. Findings: No rash. Neurological:      Mental Status: He is alert and oriented to person, place, and time. Comments: No gross motor or sensory deficits          MDM  Number of Diagnoses or Management Options  Diagnosis management comments: Patient with abdominal pain with episodes of diarrhea with associated nausea. Differential diagnosis includes gastroenteritis, colitis, diverticulitis, appendicitis, bowel obstruction or ileus  Plan labs, imaging. Symptomatic treatment. No significant lab abnormalities. Lipase normal.  CAT scan with no significant findings. Will treat patient symptoms.   Referral to GI doctor       Amount and/or Complexity of Data Reviewed  Clinical lab tests: reviewed  Tests in the radiology section of CPT®: reviewed           Procedures      Recent Results (from the past 24 hour(s))   METABOLIC PANEL, COMPREHENSIVE    Collection Time: 10/06/20  8:43 PM   Result Value Ref Range    Sodium 135 (L) 136 - 145 mmol/L    Potassium 3.9 3.5 - 5.1 mmol/L    Chloride 105 97 - 108 mmol/L    CO2 24 21 - 32 mmol/L    Anion gap 6 5 - 15 mmol/L    Glucose 177 (H) 65 - 100 mg/dL    BUN 11 6 - 20 MG/DL    Creatinine 0.79 0.70 - 1.30 MG/DL    BUN/Creatinine ratio 14 12 - 20      GFR est AA >60 >60 ml/min/1.73m2    GFR est non-AA >60 >60 ml/min/1.73m2    Calcium 8.8 8.5 - 10.1 MG/DL    Bilirubin, total 0.6 0.2 - 1.0 MG/DL    ALT (SGPT) 35 12 - 78 U/L    AST (SGOT) 19 15 - 37 U/L    Alk. phosphatase 81 45 - 117 U/L    Protein, total 7.3 6.4 - 8.2 g/dL    Albumin 3.8 3.5 - 5.0 g/dL    Globulin 3.5 2.0 - 4.0 g/dL    A-G Ratio 1.1 1.1 - 2.2     CBC WITH AUTOMATED DIFF    Collection Time: 10/06/20  8:43 PM   Result Value Ref Range    WBC 7.0 4.1 - 11.1 K/uL    RBC 5.39 4. 10 - 5.70 M/uL    HGB 15.4 12.1 - 17.0 g/dL    HCT 45.4 36.6 - 50.3 %    MCV 84.2 80.0 - 99.0 FL    MCH 28.6 26.0 - 34.0 PG    MCHC 33.9 30.0 - 36.5 g/dL    RDW 14.3 11.5 - 14.5 %    PLATELET 425 (L) 743 - 400 K/uL    MPV 9.4 8.9 - 12.9 FL    NRBC 0.0 0  WBC    ABSOLUTE NRBC 0.00 0.00 - 0.01 K/uL    NEUTROPHILS 71 32 - 75 %    LYMPHOCYTES 20 12 - 49 %    MONOCYTES 6 5 - 13 %    EOSINOPHILS 1 0 - 7 %    BASOPHILS 1 0 - 1 %    IMMATURE GRANULOCYTES 1 (H) 0.0 - 0.5 %    ABS. NEUTROPHILS 4.9 1.8 - 8.0 K/UL    ABS. LYMPHOCYTES 1.4 0.8 - 3.5 K/UL    ABS. MONOCYTES 0.4 0.0 - 1.0 K/UL    ABS. EOSINOPHILS 0.1 0.0 - 0.4 K/UL    ABS. BASOPHILS 0.1 0.0 - 0.1 K/UL    ABS. IMM.  GRANS. 0.1 (H) 0.00 - 0.04 K/UL    DF AUTOMATED     LIPASE    Collection Time: 10/06/20  8:43 PM   Result Value Ref Range    Lipase 91 73 - 393 U/L   SAMPLES BEING HELD    Collection Time: 10/06/20  8:43 PM   Result Value Ref Range    SAMPLES BEING HELD 1blu  1red     COMMENT        Add-on orders for these samples will be processed based on acceptable specimen integrity and analyte stability, which may vary by analyte. Xr Abd Acute W 1 V Chest    Result Date: 10/6/2020  EXAM: Supine and upright views of the abdomen and a frontal view of the chest are provided. INDICATION:  Abdomen pain. The bowel gas pattern is nonobstructive and unremarkable. There is no free intraperitoneal air. There is no evident organomegaly or significant intraabdominal calcification. The lungs are free of acute disease and significant findings. Heart size is normal and there is no overt pulmonary edema. IMPRESSION:  No acute disease. Ct Abd Pelv W Cont    Result Date: 10/6/2020  EXAM:  CT ABD PELV W CONT INDICATION: abd pain COMPARISON: Abdominal radiograph 10/6/2020, CT abdomen pelvis 11/20/2010. CONTRAST:  100 mL of Isovue-370. TECHNIQUE: Following the uneventful intravenous administration of contrast, thin axial images were obtained through the abdomen and pelvis. Coronal and sagittal reconstructions were generated. Oral contrast was not administered. CT dose reduction was achieved through use of a standardized protocol tailored for this examination and automatic exposure control for dose modulation. FINDINGS: Lower Thorax: Lung Bases: Clear. Heart: The heart is normal in size. No pericardial effusion. Abdomen/Pelvis: Liver:  No focal liver lesions. Diffuse hepatic steatosis. Biliary system: Gallbladder is unremarkable. No intrahepatic or extrahepatic biliary ductal dilatation. Spleen: Stable small enhancing lesion in the spleen measuring 11 mm, likely a hemangioma or other benign lesion. Pancreas: Normal. Kidneys/Ureters/Bladder: No renal masses. No renal or ureteral calculi. No hydronephrosis or hydroureter. The bladder is normal. Adrenals: Stable 17 mm left adrenal nodule, consistent with an adenoma given stability. Stomach/bowel: No dilation or abnormal wall thickening is present. No free intraperitoneal air noted. Reproductive Organs: Prostate is normal in size with coarse calcifications.  Vasculature: Normal caliber arteries. Portal vein, SMV, and splenic vein are patent. Nodes: No pathologically enlarged lymph nodes. There are numerous surgical clips noted throughout the retroperitoneum. Fluid: No free fluid. Bones/Soft Tissue: No acute fractures or aggressive osseous lesions are seen. IMPRESSION: 1. No evidence of acute process in the abdomen or pelvis. 2. Hepatic steatosis. 3. Stable left adrenal adenoma.

## 2020-10-08 ENCOUNTER — PATIENT OUTREACH (OUTPATIENT)
Dept: OTHER | Age: 60
End: 2020-10-08

## 2020-10-08 NOTE — PROGRESS NOTES
Patient identified as eligible for [address] [place] [address] services. Second telephone outreach attempted. Will attempt another outreach on 10/15. Left discreet voicemail with this CM confidential contact information. Will send UTR letter. Patient has appointment with Dr. Yoel Morris scheduled for 12/1.

## 2020-10-08 NOTE — LETTER
10/8/2020 9:24 AM 
 
Mr. Ml Doll [address] [place] [address] Dear  Lord Ding, My name is Eliane Carvalho, Associate Care Manager for [address] [place] [address] and I have been trying to reach you. The Associate Care Management (ACM) program is a free-of-charge confidential service provided to our associates and their family members covered by the [facility]. The program will provide an associate and his/her family with the [address] expertise to assist in navigating the health care delivery system, provider services, and their overall care needsso as to assure and improve health care interactions and enhance the quality of life. This program is designed to provide you with the opportunity to have a [address] [place] [address] FOR CHILDREN partner with you for the following services: 
 
 1) when you come home from the hospital or emergency room 2) when help is needed to manage your disease 3) when you need assistance coordinating services or appointments 4) when you need additional education, resources or assistance reaching your Be Well Health Program goals/requirements such as Be Well With Diabetes [address] [place] [address] is dedicated to empowering the good health of its community and improving the quality of care and care experiences for associates and their families. We are committed to safeguarding patient confidentiality and privacy, assuring that every associate has the respect he or she deserves in managing their health. The information shared with your care manager will not be shared with anyone else aside from those you identify as part of your care team, and will only be used to assist you with any identified care needs. Please contact me if you would like this service provided to you. Sincerely, LANE Dixon  Associate Care Manager [facility].  
 [address] [place] [address] Drive, [address] [place] [address] [address] [facility] [phone]  F [phone]  [email or web address] email: [email or web address]

## 2020-10-15 ENCOUNTER — PATIENT OUTREACH (OUTPATIENT)
Dept: OTHER | Age: 60
End: 2020-10-15

## 2020-10-15 NOTE — PROGRESS NOTES
Patient identified as eligible for [address] [place] [address] services. Third telephone outreach attempted. Left discreet voicemail with this CM confidential contact information. UTR letter sent on 10/8. Will resolve on 11/5 if no contact.

## 2020-11-05 ENCOUNTER — PATIENT OUTREACH (OUTPATIENT)
Dept: OTHER | Age: 60
End: 2020-11-05

## 2020-11-05 NOTE — PROGRESS NOTES
Resolving current episode for case management due to patient unable to reach. Patient has not been reached after repeated calls and letters. Letter sent to patient notifying completion of services due to unable to reach. This writer's contact information and information regarding program services included in materials sent. Per chart review, patient has appointment with Dr. Jae Garcia on 12/1.

## 2020-11-05 NOTE — LETTER
11/5/2020 8:30 AM 
 
Mr. Celia Doll [address] [place] [address] [place] [address] Dear  Bambi aLly, My name is Lora Pereira, Associate Care Manager for [facility], and I have been trying to reach you. The Associate Care Management [facility]) program is a free-of-charge, confidential service provided to our employees and their family members covered by the [facility]. I can help you with care transitions such as when you come home from the hospital, when help is needed to manage your disease, or when you need assistance coordinating services or appointments. As healthcare providers, we know that patients do better when they have close follow up with a primary care provider (PCP). I can help you find one that is convenient to you and covered by your insurance. I can also help you understand any after visit instructions, such as what symptoms to watch out for, or any new or changed medications. We can work together using your preferred communication -- telephone, email, [email or web address]. If you do not have a [employer] account, I can help you request access. Our program is designed to provide you with the opportunity to have a [facility] partner with you for your healthcare needs. Due to not being able to reach you, I am closing out the current program, but will remain available to you should you have any questions. Please contact me at the below number if I can provide you with assistance for any of the above services. COURTNEY TriplettN  Associate Care Manager [address] [place] [address]  [phone]  [phone]  F [phone]  [email or web address] email: [email or web address]

## 2020-12-22 ENCOUNTER — DOCUMENTATION ONLY (OUTPATIENT)
Dept: SLEEP MEDICINE | Age: 60
End: 2020-12-22

## 2020-12-22 NOTE — PROGRESS NOTES
Estela'ladan LMN for CPAP supplies patient last seen by Dr. Alise Pool 7/15/2019, LVM for patient to call office, he needs to be scheduled for virtual follow up visit

## 2021-07-07 NOTE — PROGRESS NOTES
PT DAILY TREATMENT NOTE - [facility] 2-15    Patient Name: Christiana Loera  Date:2017  : 1960  [x]  Patient  Verified  Payor: Ashley Chowdhury / Plan: Murleen Cogan / Product Type: PPO /    In time:115p  Out time:230p  Total Treatment Time (min): 75  Total Timed Codes (min): 60  1:1 Treatment Time ( only): --   Visit #: 5     Treatment Area: Low back pain [M54.5]    SUBJECTIVE  Pain Level (0-10 scale): 1  Any medication changes, allergies to medications, adverse drug reactions, diagnosis change, or new procedure performed?: [x] No    [] Yes (see summary sheet for update)  Subjective functional status/changes:   [] No changes reported  Patient reports he is feeling good, not in much pain at all. Patient reports he is going back to work this weekend and is a little nervous. OBJECTIVE    Modality rationale: decrease pain and increase tissue extensibility to improve the patients ability to lift, carry, sit, stand, ambulate and complete ADL's without pain.    Min Type Additional Details   15 [x] Estim: []Att   [x]Unatt        []TENS instruct                  []IFC  []Premod   []NMES                     []Other:  []w/US   []w/ice   [x]w/heat  Position:supine  Location:Back    []  Traction: [] Cervical       []Lumbar                       [] Prone          []Supine                       []Intermittent   []Continuous Lbs:  [] before manual  [] after manual  []w/heat    []  Ultrasound: []Continuous   [] Pulsed at:                           []1MHz   []3MHz Location:  W/cm2:    [] Paraffin         Location:   []w/heat    []  Ice     []  Heat  []  Ice massage Position:  Location:    []  Laser  []  Other: Position:  Location:      []  Vasopneumatic Device Pressure:       [] lo [] med [] hi   Temperature:      [x] Skin assessment post-treatment:  [x]intact []redness- no adverse reaction    []redness  adverse reaction:     60 min Therapeutic Exercise:  [x] See flow sheet :   Rationale: increase ROM and increase strength to improve the patients ability to lift, carry, sit, stand, ambulate and complete ADL's without pain. With   [x] TE   [] TA   [] neuro   [] other: Patient Education: [x] Review HEP    [x] Progressed/Changed HEP based on:   [] positioning   [] body mechanics   [] transfers   [] heat/ice application    [] other:      Other Objective/Functional Measures:      Pain Level (0-10 scale) post treatment: 0    ASSESSMENT/Changes in Function: Patient able to ride bike for 5 min today with no pain or tightness in back. Patient will continue to benefit from skilled PT services to modify and progress therapeutic interventions, address functional mobility deficits, address ROM deficits, address strength deficits, analyze and address soft tissue restrictions, analyze and cue movement patterns, analyze and modify body mechanics/ergonomics and instruct in home and community integration to attain remaining goals. []  See Plan of Care  []  See progress note/recertification  []  See Discharge Summary         Progress towards goals / Updated goals:  Patient able to perform all exercises without increased pain. Patient making excellent progress towards goals showing increased strength, standing and ambulation tolerance.      PLAN  [x]  Upgrade activities as tolerated     [x]  Continue plan of care  [x]  Update interventions per flow sheet       []  Discharge due to:_  []  Other:_      Prince Briones 1/16/2017  1:46 PM Drysol Pregnancy And Lactation Text: This medication is considered safe during pregnancy and breast feeding.

## 2021-07-18 NOTE — PROGRESS NOTES
HISTORY OF PRESENT ILLNESS  Mars Sadler is a 62 y.o. male. Patient here for follow-up since back injury at work about 1 month ago. He is feeling much improved since starting PT. He goes once weekly. He states there are times when he does not feel the pain at all. He has been assigned light duty at work, which he feels is going well. He scared to go back too soon and sustain another back injury. Visit Vitals    /86    Pulse 63    Temp 97.5 °F (36.4 °C) (Oral)    Resp 16    Ht 6' 2\" (1.88 m)    Wt 271 lb (122.9 kg)    SpO2 97%    BMI 34.79 kg/m2       Follow-up   The history is provided by the patient. This is a new problem. Review of Systems   Musculoskeletal: Positive for back pain. Physical Exam   Constitutional: He is oriented to person, place, and time. He appears well-developed and well-nourished. Musculoskeletal:        Lumbar back: He exhibits tenderness (mild tenderness to palpation of left and right paraspinal, mostly on right; pain does not radiate, no spasms noted). He exhibits normal range of motion, no bony tenderness and no swelling. Neurological: He is alert and oriented to person, place, and time. Skin: Skin is warm and dry. Psychiatric: He has a normal mood and affect. ASSESSMENT and PLAN    ICD-10-CM ICD-9-CM    1. Acute low back pain without sciatica, unspecified back pain laterality M54.5 724.2      No orders of the defined types were placed in this encounter.   worker's comp form completed  Continue light duty for 1 more week with expectation to return to full duty next week  Continue once weekly PT at this time  May use Motrin as needed for pain and flexeril at night  Patient advised I would not order more tramadol  Follow-up in 1 week 144

## 2021-07-20 ENCOUNTER — DOCUMENTATION ONLY (OUTPATIENT)
Dept: SLEEP MEDICINE | Age: 61
End: 2021-07-20

## 2022-03-19 PROBLEM — E11.40 TYPE 2 DIABETES MELLITUS WITH DIABETIC NEUROPATHY, WITH LONG-TERM CURRENT USE OF INSULIN (HCC): Status: ACTIVE | Noted: 2018-08-27

## 2022-03-19 PROBLEM — Z79.4 TYPE 2 DIABETES MELLITUS WITH DIABETIC NEUROPATHY, WITH LONG-TERM CURRENT USE OF INSULIN (HCC): Status: ACTIVE | Noted: 2018-08-27
